# Patient Record
Sex: MALE | Race: WHITE | NOT HISPANIC OR LATINO | Employment: OTHER | URBAN - METROPOLITAN AREA
[De-identification: names, ages, dates, MRNs, and addresses within clinical notes are randomized per-mention and may not be internally consistent; named-entity substitution may affect disease eponyms.]

---

## 2017-02-21 RX ORDER — MYCOPHENOLATE MOFETIL 250 MG/1
CAPSULE ORAL
Qty: 120 CAPSULE | Refills: 11 | Status: SHIPPED | OUTPATIENT
Start: 2017-02-21 | End: 2018-03-19 | Stop reason: SDUPTHER

## 2017-04-03 ENCOUNTER — TELEPHONE (OUTPATIENT)
Dept: TRANSPLANT | Facility: CLINIC | Age: 57
End: 2017-04-03

## 2017-04-03 NOTE — TELEPHONE ENCOUNTER
Received call transferred from , Dr. Moore would like to speak to MD regarding patient.  He states patient has 4.5 grams of protein in his urine and may require a kidney biopsy.  Message given to Dr Can to contact MD as requested.

## 2017-04-06 ENCOUNTER — TELEPHONE (OUTPATIENT)
Dept: TRANSPLANT | Facility: CLINIC | Age: 57
End: 2017-04-06

## 2017-04-06 NOTE — TELEPHONE ENCOUNTER
Dr. Moore called noting he is now spilling protein. A recent 24 hr urine showed over 4 grams protein. 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   I requested Dr. Moore have his office fax lab results for our records.  Given h/o APA syndrome requiring chronic anticoagulation, he will need to be admitted electively for heparin bridge OR get bridged with lovenox, depending on insurance coverage. Will ask coordinator to assess lovenox coverage while we await current labs, then depending on insurance information, make arrangements for biopsy shortly.  Will query pharmacist re possible lovenox regimen pre and post biopsy.

## 2017-04-07 RX ORDER — WARFARIN 2.5 MG/1
TABLET ORAL
Qty: 15 TABLET | Refills: 11
Start: 2017-04-07 | End: 2017-04-28 | Stop reason: SDUPTHER

## 2017-04-10 ENCOUNTER — TELEPHONE (OUTPATIENT)
Dept: TRANSPLANT | Facility: CLINIC | Age: 57
End: 2017-04-10

## 2017-04-10 RX ORDER — ENOXAPARIN SODIUM 100 MG/ML
1 INJECTION SUBCUTANEOUS EVERY 12 HOURS
Qty: 20 ML | Refills: 0 | Status: SHIPPED | OUTPATIENT
Start: 2017-04-10 | End: 2017-04-28 | Stop reason: DRUGHIGH

## 2017-04-10 NOTE — TELEPHONE ENCOUNTER
Patient's wife contacted this coordinator.  Patient is at work per wife.  Advised of STAT labs needed.  Patient will have labs tomorrow AM.  Discussed with wife plan for Lovenox, but pending labs.  She states patient stopped taking Coumadin on 4/8.  Plan for biopsy no later than Thursday.

## 2017-04-11 ENCOUNTER — TELEPHONE (OUTPATIENT)
Dept: TRANSPLANT | Facility: CLINIC | Age: 57
End: 2017-04-11

## 2017-04-11 ENCOUNTER — DOCUMENTATION ONLY (OUTPATIENT)
Dept: TRANSPLANT | Facility: CLINIC | Age: 57
End: 2017-04-11

## 2017-04-11 LAB
EXT BUN: 19
EXT CALCIUM: 9.2
EXT CHLORIDE: 101
EXT CO2: 29
EXT CREATININE: 1 MG/DL
EXT GLUCOSE: 140
EXT INR: 1
EXT POTASSIUM: 4.6
EXT SODIUM: 137 MMOL/L

## 2017-04-11 NOTE — TELEPHONE ENCOUNTER
Reviewed outside labs received with MD.  Patient's wife contacted and given instructions per Md to begin Lovenox tonight.  Patient will start 8 am and 8 pm dosing of Lovenox, [facility] contacted, rx is ready.      Reviewed with wife details of biopsy on Monday.     Wife repeated and verbalized understanding of instructions.  Will discuss with social workers regarding patient's request for [facility] stay on Sunday Evening for biopsy on Monday.

## 2017-04-12 DIAGNOSIS — R80.9 PROTEINURIA, UNSPECIFIED TYPE: ICD-10-CM

## 2017-04-12 DIAGNOSIS — Z94.0 KIDNEY REPLACED BY TRANSPLANT: Primary | ICD-10-CM

## 2017-04-17 ENCOUNTER — HOSPITAL ENCOUNTER (OUTPATIENT)
Facility: HOSPITAL | Age: 57
Discharge: HOME | End: 2017-04-17
Attending: INTERNAL MEDICINE | Admitting: RADIOLOGY
Payer: MEDICARE

## 2017-04-17 ENCOUNTER — DOCUMENTATION ONLY (OUTPATIENT)
Dept: TRANSPLANT | Facility: CLINIC | Age: 57
End: 2017-04-17

## 2017-04-17 ENCOUNTER — HOSPITAL ENCOUNTER (OUTPATIENT)
Dept: RADIOLOGY | Facility: HOSPITAL | Age: 57
Discharge: HOME | End: 2017-04-17
Attending: INTERNAL MEDICINE | Admitting: INTERNAL MEDICINE
Payer: MEDICARE

## 2017-04-17 ENCOUNTER — TELEPHONE (OUTPATIENT)
Dept: TRANSPLANT | Facility: CLINIC | Age: 57
End: 2017-04-17

## 2017-04-17 VITALS
RESPIRATION RATE: 18 BRPM | TEMPERATURE: 98 F | HEART RATE: 66 BPM | SYSTOLIC BLOOD PRESSURE: 148 MMHG | DIASTOLIC BLOOD PRESSURE: 76 MMHG | OXYGEN SATURATION: 98 %

## 2017-04-17 DIAGNOSIS — R80.9 PROTEINURIA, UNSPECIFIED TYPE: ICD-10-CM

## 2017-04-17 DIAGNOSIS — Z94.0 KIDNEY REPLACED BY TRANSPLANT: ICD-10-CM

## 2017-04-17 DIAGNOSIS — T86.10 COMPLICATION OF KIDNEY TRANSPLANT: ICD-10-CM

## 2017-04-17 LAB
EXT ALBUMIN: 3.5
EXT ALKALINE PHOSPHATASE: 117
EXT ALT: 19
EXT AST: 23
EXT BILIRUBIN TOTAL: 0.5
EXT BUN: 17
EXT CALCIUM: 9.2
EXT CHLORIDE: 103
EXT CO2: 27
EXT CREATININE: 1 MG/DL
EXT EOSINOPHIL%: 2
EXT GFR MDRD NON AF AMER: >60
EXT GLUCOSE: 140
EXT HEMATOCRIT: 45
EXT HEMOGLOBIN A1C: 5.8
EXT HEMOGLOBIN: 15
EXT LYMPH%: 26
EXT MONOCYTES%: 10
EXT PHOSPHORUS: 2.8
EXT PLATELETS: 97
EXT POTASSIUM: 5.2
EXT PROT/CREAT RATIO UR: 1.59
EXT PROTEIN TOTAL: 5.6
EXT PTH, INTACT: 140
EXT SEGS%: 62
EXT SODIUM: 138 MMOL/L
EXT TACROLIMUS LVL: 4
EXT WBC: 4.2

## 2017-04-17 PROCEDURE — 76776 US EXAM K TRANSPL W/DOPPLER: CPT | Mod: TC

## 2017-04-17 PROCEDURE — 76776 US EXAM K TRANSPL W/DOPPLER: CPT | Mod: 26,GC,, | Performed by: RADIOLOGY

## 2017-04-17 PROCEDURE — 25000003 PHARM REV CODE 250: Performed by: STUDENT IN AN ORGANIZED HEALTH CARE EDUCATION/TRAINING PROGRAM

## 2017-04-17 PROCEDURE — 63600175 PHARM REV CODE 636 W HCPCS: Performed by: STUDENT IN AN ORGANIZED HEALTH CARE EDUCATION/TRAINING PROGRAM

## 2017-04-17 RX ADMIN — DESMOPRESSIN ACETATE 14.84 MCG: 4 SOLUTION INTRAVENOUS at 12:04

## 2017-04-17 NOTE — IP AVS SNAPSHOT
[facility]  [address]  [facility] [address]  Phone: [phone]           Patient Discharge Instructions   Our goal is to set you up for success. This packet includes information on your condition, medications, and your home care.  It will help you care for yourself to prevent having to return to the hospital.     Please ask your nurse if you have any questions.      There are many details to remember when preparing to leave the hospital. Here is what you will need to do:    1. Take your medicine. If you are prescribed medications, review your Medication List on the following pages. You may have new medications to  at the pharmacy and others that you'll need to stop taking. Review the instructions for how and when to take your medications. Talk with your doctor or nurses if you are unsure of what to do.     2. Go to your follow-up appointments. Specific follow-up information is listed in the following pages. Your may be contacted by a nurse or clinical provider about future appointments. Be sure we have all of the phone numbers to reach you. Please contact your provider's office if you are unable to make an appointment.     3. Watch for warning signs. Your doctor or nurse will give you detailed warning signs to watch for and when to call for assistance. These instructions may also include educational information about your condition. If you experience any of warning signs to your health, call your doctor.           [facility]  Unless otherwise directed by your provider, please   contact [facility], our nurse care line   that is available for 24/7 assistance.     [phone] (toll-free)     Registered nurses in the [facility]   provide: appointment scheduling, clinical advisement, health education, and other advisory services.                  ** Verify the list of medication(s) below is accurate and up to date. Carry this with you in case of  emergency. If your medications have changed, please notify your healthcare provider.             Medication List      ASK your doctor about these medications        Additional Info                      aspirin 81 mg Tab   Refills:  0   Dose:  1 tablet    Instructions:  Take 1 tablet by mouth once daily.     Begin Date    AM    Noon    PM    Bedtime       atorvastatin 40 MG tablet   Commonly known as:  LIPITOR   Quantity:  30 tablet   Refills:  5   Dose:  40 mg    Instructions:  Take 1 tablet (40 mg total) by mouth once daily. 1 Tablet Oral Every day     Begin Date    AM    Noon    PM    Bedtime       EFFIENT 10 mg Tab   Refills:  0   Dose:  10 mg   Generic drug:  prasugrel    Instructions:  Take 10 mg by mouth once daily.     Begin Date    AM    Noon    PM    Bedtime       enoxaparin 100 mg/mL Syrg   Commonly known as:  LOVENOX   Quantity:  20 mL   Refills:  0   Dose:  1 mg/kg    Instructions:  Inject 1 mL (100 mg total) into the skin every 12 (twelve) hours.     Begin Date    AM    Noon    PM    Bedtime       ERGOCALCIFEROL (VITAMIN D2) MISC   Refills:  0   Dose:  1.25 mg    Instructions:  1.25 mg by Misc.(Non-Drug; Combo Route) route every 30 days.     Begin Date    AM    Noon    PM    Bedtime       esomeprazole 20 MG capsule   Commonly known as:  NEXIUM   Refills:  0   Dose:  40 mg    Instructions:  Take 40 mg by mouth before breakfast.     Begin Date    AM    Noon    PM    Bedtime       levothyroxine 50 MCG tablet   Commonly known as:  SYNTHROID   Refills:  0   Dose:  50 mcg    Instructions:  Take 50 mcg by mouth once daily.     Begin Date    AM    Noon    PM    Bedtime       linagliptin 5 mg Tab tablet   Commonly known as:  TRADJENTA   Refills:  0   Dose:  5 mg    Instructions:  Take 5 mg by mouth once daily.     Begin Date    AM    Noon    PM    Bedtime       metoprolol tartrate 100 MG tablet   Commonly known as:  LOPRESSOR   Quantity:  90 tablet   Refills:  11   Dose:  100 mg    Instructions:  Take 1 tablet  (100 mg total) by mouth 2 (two) times daily. HOLD DOSE if HR<50 and call     Begin Date    AM    Noon    PM    Bedtime       multivitamin tablet   Commonly known as:  THERAGRAN   Quantity:  30 tablet   Refills:  0   Dose:  1 tablet    Instructions:  Take 1 tablet by mouth once daily.     Begin Date    AM    Noon    PM    Bedtime       mycophenolate 250 mg Cap   Commonly known as:  CELLCEPT   Quantity:  120 capsule   Refills:  11    Instructions:  TAKE (2) CAPSULES TWICE DAILY.     Begin Date    AM    Noon    PM    Bedtime       nifedipine 60 MG (OSM) 24 hr tablet   Commonly known as:  PROCARDIA-XL   Quantity:  60 tablet   Refills:  11   Dose:  60 mg    Instructions:  Take 1 tablet (60 mg total) by mouth 2 (two) times daily.     Begin Date    AM    Noon    PM    Bedtime       tacrolimus 1 MG Cap   Commonly known as:  PROGRAF   Quantity:  60 capsule   Refills:  11   Dose:  1 mg    Instructions:  Take 1 capsule (1 mg total) by mouth every 12 (twelve) hours.     Begin Date    AM    Noon    PM    Bedtime       warfarin 2.5 MG tablet   Commonly known as:  COUMADIN   Quantity:  15 tablet   Refills:  11    Instructions:  HOLD  For Biopsy     Begin Date    AM    Noon    PM    Bedtime                  Please bring to all follow up appointments:    1. A copy of your discharge instructions.  2. All medicines you are currently taking in their original bottles.  3. Identification and insurance card.    Please arrive 15 minutes ahead of scheduled appointment time.    Please call 24 hours in advance if you must reschedule your appointment and/or time.            Discharge Instructions         Discharge Instructions for Kidney Biopsy  You had a procedure called a kidney biopsy. Your doctor used a special needle to remove a small piece of tissue from your kidney to examine it for signs of damage and disease. A kidney biopsy is ordered after other tests have shown that there may be a problem with your kidney. Kidney biopsies are also  performed when kidney disease is suspected and to rule out cancer.  Home care  · Rest for 24 hours to 48 hours. Get up only to use the bathroom.  · Dont drive for 24 hours to 48 hours after the procedure.  · Dont shower for 24 hours after the biopsy. If you wish, you may wash yourself with a sponge or washcloth. When you are able to shower, dont scrub the site. Gently wash the area and pat it dry.  · Remove the bandage covering the biopsy site 24 hours to 48 hours after the procedure.  · Dont lift anything heavier than 10 pounds for 3 days to 4 days after the procedure.  · Ask your doctor when you can return to work. Be sure to tell your doctor if your job involves heavy lifting.  · If you normally take blood thinner medications (anticoagulants or antiplatelet medications) and you stopped taking them a few days before your procedure, ask your doctor when to start taking them again.  When to seek medical care  Call your doctor right away if you have any of the following:  · Blood in your urine  · Exhaustion or extreme weakness  · Dizziness or lightheadedness  · Sudden or increased shortness of breath  · Sudden chest pain  · Fever of 100.4°F (38°C) or higher, or chills  · Increasing redness, tenderness, or swelling at the biopsy site  · Opening of or drainage or bleeding from the biopsy site  · Increasing pain, with or without activity   Date Last Reviewed: 1/6/2015  © 7433-4555 [employer]. [address] [place] [address]. All rights reserved. This information is not intended as a substitute for professional medical care. Always follow your healthcare professional's instructions.            Admission Information     Date & Time Provider Department CSN    4/17/2017 11:12 AM Rose Woods MD [facility] [id number]      Care Providers     Provider Role Specialty Primary office phone    Rose Woods MD Attending Provider Nephrology [phone]     Rose Woods MD Surgeon  Nephrology [phone]      Your Vitals Were     BP Pulse Temp Resp SpO2       146/79 66 97.1 °F (36.2 °C) (Oral) 18 98%       Recent Lab Values        8/14/2013 10/9/2013 3/27/2017                     4:40 PM  3:20 AM          A1C 6.2 7.6 (H) 5.8                   Allergies as of 4/17/2017        Reactions    No Known Drug Allergies       Advance Directives     An advance directive is a document which, in the event you are no longer able to make decisions for yourself, tells your healthcare team what kind of treatment you do or do not want to receive, or who you would like to make those decisions for you.  If you do not currently have an advance directive, [facility] encourages you to create one.  For more information call:  [phone]), [phone] ([phone]),  or log on to [email or web address].        Smoking Cessation     If you would like to quit smoking:   You may be eligible for free services if you are a [place] resident and started smoking cigarettes before September 1, 1988.  Call the Smoking Cessation Trust ([facility]) toll free at [phone] or [phone].   Call 6-913-QUIT-NOW if you do not meet the above criteria.   Contact us via email: [email or web address]   View our website for more information: [email or web address]        Language Assistance Services     ATTENTION: Language assistance services are available, free of charge. Please call [phone].      ATENCIÓN: Si habla español, tiene a allen disposición servicios gratuitos de asistencia lingüística. Llame al [phone].     CHÚ Ý: N?u b?n nói Ti?ng Vi?t, có các d?ch v? h? tr? ngôn ng? mi?n phí dành cho b?n. G?i s? [phone].        Coumadin Discharge Instructions                         Chronic Kindey Disease Education              [facility] complies with applicable Federal civil rights laws and does not discriminate on the basis of race,  color, national origin, age, disability, or sex.

## 2017-04-17 NOTE — DISCHARGE INSTRUCTIONS
Discharge Instructions for Kidney Biopsy  You had a procedure called a kidney biopsy. Your doctor used a special needle to remove a small piece of tissue from your kidney to examine it for signs of damage and disease. A kidney biopsy is ordered after other tests have shown that there may be a problem with your kidney. Kidney biopsies are also performed when kidney disease is suspected and to rule out cancer.  Home care  · Rest for 24 hours to 48 hours. Get up only to use the bathroom.  · Dont drive for 24 hours to 48 hours after the procedure.  · Dont shower for 24 hours after the biopsy. If you wish, you may wash yourself with a sponge or washcloth. When you are able to shower, dont scrub the site. Gently wash the area and pat it dry.  · Remove the bandage covering the biopsy site 24 hours to 48 hours after the procedure.  · Dont lift anything heavier than 10 pounds for 3 days to 4 days after the procedure.  · Ask your doctor when you can return to work. Be sure to tell your doctor if your job involves heavy lifting.  · If you normally take blood thinner medications (anticoagulants or antiplatelet medications) and you stopped taking them a few days before your procedure, ask your doctor when to start taking them again.  When to seek medical care  Call your doctor right away if you have any of the following:  · Blood in your urine  · Exhaustion or extreme weakness  · Dizziness or lightheadedness  · Sudden or increased shortness of breath  · Sudden chest pain  · Fever of 100.4°F (38°C) or higher, or chills  · Increasing redness, tenderness, or swelling at the biopsy site  · Opening of or drainage or bleeding from the biopsy site  · Increasing pain, with or without activity   Date Last Reviewed: 1/6/2015  © 0990-3099 [employer]. [address] [place] [address], [place] [address]. All rights reserved. This information is not intended as a substitute for professional medical care. Always follow your  healthcare professional's instructions.

## 2017-04-17 NOTE — PLAN OF CARE
Problem: Patient Care Overview  Goal: Plan of Care Review    Vitals stable, no complaints from patient.Consents in chart. Patient verbalized understanding discharge instructions.

## 2017-04-17 NOTE — PROCEDURES
Radiology Post-Procedure Note    Pre Op Diagnosis: Renal dysfunction    Post Op Diagnosis: Same    Procedure: Ultrasound guided kidney transplant biopsy    Procedure performed by: Saumya Martin MD      Written Informed Consent Obtained: Yes    Specimen Removed: YES 3 cores    Estimated Blood Loss: Minimal    Findings: Moderate sedation and local anesthesia were used.    A 16-gauge Monopty biopsy device was used to remove 3 specimens from the transplant  kidney under ultrasound guidance.  Tissue was evaluated  for adequacy and sent to pathology for further analysis.      The patient tolerated the procedure well and there were no complications.  Please see Imaging report for further details.    Dustin Lavricha  Radiology PGY-3

## 2017-04-17 NOTE — DISCHARGE SUMMARY
Radiology Discharge Summary      Hospital Course: No complications    Admit Date: 4/17/2017  Discharge Date: 04/17/2017     Instructions Given to Patient: Yes  Diet: regular diet and Resume prior diet  Activity: activity as tolerated    Description of Condition on Discharge: Stable  Vital Signs (Most Recent):      Discharge Disposition: Home    Discharge Diagnosis: s/p kidney transplant biopsy    Follow-up per ordering provider.    Dustin Arthur  Radiology PGY-3

## 2017-04-17 NOTE — TELEPHONE ENCOUNTER
Pt contacted and advised of MD's lab review and instructions.  Pt. repeated instructions and verbalized understanding.     Reviewed anticoagulation bridge plans in detail: Patient to restart Lovenox and Coumadin on Tuesday night per MD orders.  Patient will take Lovenox @ 8 pm and 8 am.  Labs on Thursday @ 12 noon. Advised to get to the lab early so that labs are drawn right at 12 noon.    Discussed signs of bleeding with patient and wife, advised to contact transplant team ASAP, but report to ER for any bright red bleeding.  Wife repeated instructions and took notes, she verbalized understanding.    Advised of need to avoid high K foods in his diet, list given, patient/wife verbalized understanding.

## 2017-04-17 NOTE — PLAN OF CARE
Problem: Fall Risk (Adult)  Goal: Identify Related Risk Factors and Signs and Symptoms  Related risk factors and signs and symptoms are identified upon initiation of Human Response Clinical Practice Guideline (CPG)   Bed in low position, call light within reach, patient visualized from nurses station.

## 2017-04-17 NOTE — H&P
Radiology History & Physical      SUBJECTIVE:     Chief Complaint: kidney transplant    History of Present Illness:  Anthony Burgess is a 57 y.o. male with proteinuria who presents for  US guided kidney transplant biopsy.  Past Medical History:   Diagnosis Date    Anemia of chronic renal failure     Anticoagulant long-term use     Antiphospholipid antibody syndrome     pulmonary embolism and arterial occlusions    Asthma     CKD (chronic kidney disease) 2013    CKD (chronic kidney disease), stage II 10/9/2013    Coronary artery disease     -donor kidney transplant for GN 13    Elevated glucose 2013    ESRD (end stage renal disease) 2005    secondary to glomerulonephritis    Hyperglycemia 2013    Hyperlipidemia     Hypertension 2014    Hypertension, renal     Hypophosphatemia     Hypothyroidism     Kidney stone     Myocardial infarction     Neutropenia, drug-induced 2013    Prophylactic immunotherapy for kidney transplant     PVD (peripheral vascular disease)     s/p right fem-pop    Secondary hyperparathyroidism of renal origin      Past Surgical History:   Procedure Laterality Date    CARDIAC SURGERY      FEMORAL BYPASS      Right lower extremity    FRACTURE SURGERY      KIDNEY TRANSPLANT      LEG SURGERY      TONSILLECTOMY      VASCULAR SURGERY         Home Meds:   Prior to Admission medications    Medication Sig Start Date End Date Taking? Authorizing Provider   atorvastatin (LIPITOR) 40 MG tablet Take 1 tablet (40 mg total) by mouth once daily. 1 Tablet Oral Every day 13  Yes Tierra Tnoey, NP   enoxaparin (LOVENOX) 100 mg/mL Syrg Inject 1 mL (100 mg total) into the skin every 12 (twelve) hours. 4/10/17  Yes Rose Woods MD   ERGOCALCIFEROL, VITAMIN D2, MISC 1.25 mg by Misc.(Non-Drug; Combo Route) route every 30 days.   Yes Historical Provider, MD   esomeprazole (NEXIUM) 20 MG capsule Take 40 mg by  mouth before breakfast.    Yes Historical Provider, MD   levothyroxine (SYNTHROID) 50 MCG tablet Take 50 mcg by mouth once daily.   Yes Historical Provider, MD   linagliptin (TRADJENTA) 5 mg Tab tablet Take 5 mg by mouth once daily. 10/19/14 7/13/17 Yes Ines Mckeon MD   mycophenolate (CELLCEPT) 250 mg Cap TAKE (2) CAPSULES TWICE DAILY. 2/21/17  Yes Calvin Gerber MD   nifedipine (PROCARDIA-XL) 60 MG (OSM) 24 hr tablet Take 1 tablet (60 mg total) by mouth 2 (two) times daily.  Patient taking differently: Take 60 mg by mouth once daily.  8/23/13  Yes Rose Woods MD   tacrolimus (PROGRAF) 1 MG Cap Take 1 capsule (1 mg total) by mouth every 12 (twelve) hours. 7/7/16  Yes Rose Woods MD   aspirin 81 mg Tab Take 1 tablet by mouth once daily.     Historical Provider, MD   metoprolol tartrate (LOPRESSOR) 100 MG tablet Take 1 tablet (100 mg total) by mouth 2 (two) times daily. HOLD DOSE if HR<50 and call  Patient taking differently: Take 50 mg by mouth 2 (two) times daily. HOLD DOSE if HR<50 and call 1/5/15   Aly Hoffman MD   multivitamin (THERAGRAN) tablet Take 1 tablet by mouth once daily. 7/16/13   Tierra Toney, NP   prasugrel (EFFIENT) 10 mg Tab Take 10 mg by mouth once daily.    Historical Provider, MD   warfarin (COUMADIN) 2.5 MG tablet HOLD  For Biopsy 4/7/17   Rose Woods MD     Anticoagulants/Antiplatelets: warfarin held for procedure with lovenox bridge, last dose of lovenox yesterday morining, aspirn held     Allergies:   Review of patient's allergies indicates:   Allergen Reactions    No known drug allergies      Sedation History:  no adverse reactions    Review of Systems:   Hematological: no known coagulopathies  Respiratory: no shortness of breath  Cardiovascular: no chest pain  Gastrointestinal: no abdominal pain  Genito-Urinary: no dysuria  Musculoskeletal: negative  Neurological: no TIA or stroke symptoms         OBJECTIVE:     Vital Signs (Most  Recent)       Physical Exam:  ASA: 3  Mallampati: n/a    General: no acute distress  Mental Status: alert and oriented to person, place and time  HEENT: normocephalic, atraumatic  Chest: unlabored breathing  Heart: regular heart rate  Abdomen: nondistended  Extremity: moves all extremities    Laboratory  Lab Results   Component Value Date    INR 0.9 04/17/2017       Lab Results   Component Value Date    WBC 3.10 (L) 04/17/2017    HGB 14.9 04/17/2017    HCT 44.7 04/17/2017    MCV 90 04/17/2017    PLT 97 (L) 04/17/2017      Lab Results   Component Value Date     (H) 04/17/2017     04/17/2017    K 5.3 (H) 04/17/2017     04/17/2017    CO2 29 04/17/2017    BUN 18 04/17/2017    CREATININE 1.0 04/17/2017    CALCIUM 9.0 04/17/2017    MG 1.6 07/21/2013    ALT 69 (H) 07/21/2013    AST 43 (H) 07/21/2013    ALBUMIN 3.6 08/05/2013    BILITOT 0.4 07/21/2013    BILIDIR 0.2 07/21/2013       ASSESSMENT/PLAN:     Sedation Plan: local only  Patient will undergo US guided kidney transplant biopsy.    Dustin Arthur  Radiology PGY-3

## 2017-04-17 NOTE — TELEPHONE ENCOUNTER
----- Message from Pat Mckeon MD sent at 4/17/2017  3:53 PM CDT -----  Potassium on higher end -->  on low potassium diet

## 2017-04-18 ENCOUNTER — TELEPHONE (OUTPATIENT)
Dept: TRANSPLANT | Facility: CLINIC | Age: 57
End: 2017-04-18

## 2017-04-19 ENCOUNTER — TELEPHONE (OUTPATIENT)
Dept: TRANSPLANT | Facility: CLINIC | Age: 57
End: 2017-04-19

## 2017-04-19 NOTE — TELEPHONE ENCOUNTER
Biopsy 4/17/17 (proteinuria): 21 glomeruli, 2 globally sclerosed, 1 with segmental sclerosis, 1 with fibrocellular crescent (from ischemia). No ACR, AVR, microcirculation changes, c4d negative. IF consistent with membranous pattern. PLA2R, kappa and lambda stains pending.  Plan: obtain PLA2R from blood and schedule appointment in 2 weeks to review biopsy findings (by then should have final report). Also, Let's call his nephrologist's office to get a copy of native biopsy report (EMR has conflicting information).

## 2017-04-19 NOTE — TELEPHONE ENCOUNTER
----- Message from Mery Cast sent at 4/19/2017 12:36 PM CDT -----  Contact: patient   Patient would like a call from [place]. Please call

## 2017-04-19 NOTE — TELEPHONE ENCOUNTER
Call placed to patient, discussed biopsy results and MD instructions.  Patient and wife verbalized understanding.  Patient also wanted to discuss his concern with taking Lovenox along with Coumadin 5 mg.  He says he would be more comfortable restarting on 1 mg of Coumadin.  Patient has STAT labs scheduled in the AM.    Message forwarded to MD.

## 2017-04-20 ENCOUNTER — DOCUMENTATION ONLY (OUTPATIENT)
Dept: TRANSPLANT | Facility: CLINIC | Age: 57
End: 2017-04-20

## 2017-04-20 ENCOUNTER — TELEPHONE (OUTPATIENT)
Dept: TRANSPLANT | Facility: CLINIC | Age: 57
End: 2017-04-20

## 2017-04-20 LAB
EXT BUN: 16
EXT CALCIUM: 9
EXT CHLORIDE: 100
EXT CO2: 25
EXT CREATININE: 1 MG/DL
EXT GLUCOSE: 189
EXT INR: 1
EXT POTASSIUM: 4.6
EXT SODIUM: 136 MMOL/L

## 2017-04-20 NOTE — TELEPHONE ENCOUNTER
Call placed to pharm D, discussed INR results and patient's concerned noted on yesterday.  Pharm FRANSICO, recommends 5 mg coumadin until therapeutic level with repeat INR tomorrow and then 3 times weekly.      Patient contacted, he reports he actually took 2.5 mg of Coumadin and he verbalized understanding of instructions.  Patient encouraged to get back in to see the Coumadin clinic at his cardiologist office, he verbalized understanding.  All questions answered.

## 2017-04-21 ENCOUNTER — TELEPHONE (OUTPATIENT)
Dept: TRANSPLANT | Facility: CLINIC | Age: 57
End: 2017-04-21

## 2017-04-21 LAB — EXT INR: 1

## 2017-04-21 NOTE — TELEPHONE ENCOUNTER
Call placed to Pharm D regarding INR of 1.0, advised that anti Xa will not be available until next week...  instructions received to increase Coumadin to 5 mg daily, patient verbalized understanding.   Advised to repeat labs, but patient requesting to repeat labs on Tuesday.

## 2017-04-21 NOTE — TELEPHONE ENCOUNTER
Call placed to the lab @ [facility] to request PT/INR results and anti Xa, per staff, this lab wont be resulted until 5-7 days, stating it is a send out to [facility].

## 2017-04-21 NOTE — TELEPHONE ENCOUNTER
PLA2R non-diagnostic. EM was performed on parafin tissues (thus suboptimal sample): + subepithelial deposits but no subendothelial deposits noted.  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 Pathologist suggests r/o ANCA associated disease.    PLAN- check ANCA with next labs. Await patient's visit to discuss biopsy and possible rituxan use.

## 2017-04-26 ENCOUNTER — DOCUMENTATION ONLY (OUTPATIENT)
Dept: TRANSPLANT | Facility: CLINIC | Age: 57
End: 2017-04-26

## 2017-04-26 ENCOUNTER — TELEPHONE (OUTPATIENT)
Dept: TRANSPLANT | Facility: CLINIC | Age: 57
End: 2017-04-26

## 2017-04-26 LAB — EXT INR: 1.5

## 2017-04-26 NOTE — PROGRESS NOTES
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 Pt has 5 mg tablets.    Mr Burgess is being bridged with enoxaparin following a kidney biopsy.  Transplant nephrology is following his INR/anti-Xa monitoring until he is back in the therapeutic range and care can return to his cardiologist.  After speaking to Mr Burgess, it was revealed that he initially cut his 5 mg tablets into quarters and was increasing his dose by 1/2 tablet (1.25 mg) from 1/4 tablet (1.25 mg) daily, which would explain why it is taking so long to reach a therapeutic INR.  He reports he has been taking 7.5 mg for approximately the last four days.      Spoke with Anthony Burgess regarding their warfarin therapy. Patient reports no signs of bleeding, no changes in medication or vitamin k in his diet. Patient is being monitored on interactions with these medications: none  Anthony Burgess reports taking warfarin 7.5 mg last night (see chart below for recent history of doses received). Anthony Burgess INR is subtherapeutic at 1.5.     Additionally, monitoring for enoxaparin resulted today (drawn 4/21) was 1.5 IU/ml-- which is supratherapeutic (goal is 0.5-1.0)      Date 4.20 4.21 4.22 4.23 4.24 4/25   Warfarin Dose (mg)   ? ? 7.5 7.5 7.5 7.5    INR  1    1.5     Plan for warfarin monitoring: Recommended to continue 7.5 mg and recheck INR again Friday.  Patient reports he has a home INR monitor that he has used for years.  Additionally, I advised him to decrease his enoxaparin dose to 80 mg (20% decrease).  I explained to the patient that his enoxaparin syringe is calibrated-- he reports he has done this before.  Patient to transition to his home cardiologist when stable for further warfarin monitoring.  Patient verbalized understanding.

## 2017-04-26 NOTE — TELEPHONE ENCOUNTER
Call placed to patient, discussed results received.  Patient denies any s/s of bleeding, encouraged patient to monitoring for s/s and report ASAP.  Patient reporting his is taking Coumadin 5 mg nightly and Lovenox as ordered every 12 hours.  Verified with patient that he has a new supply of Lovenox.  This coordinator will review plan with nephrologist and pharm D and advise patient.  Patient verbalized understanding.

## 2017-04-28 ENCOUNTER — TELEPHONE (OUTPATIENT)
Dept: TRANSPLANT | Facility: CLINIC | Age: 57
End: 2017-04-28

## 2017-04-28 DIAGNOSIS — D68.61 ANTIPHOSPHOLIPID SYNDROME: Primary | ICD-10-CM

## 2017-04-28 RX ORDER — WARFARIN SODIUM 5 MG/1
7.5 TABLET ORAL DAILY
COMMUNITY
Start: 2017-04-28 | End: 2017-05-02 | Stop reason: DRUGHIGH

## 2017-04-28 RX ORDER — ENOXAPARIN SODIUM 100 MG/ML
80 INJECTION SUBCUTANEOUS EVERY 12 HOURS
Qty: 10 SYRINGE | Refills: 0 | Status: SHIPPED | OUTPATIENT
Start: 2017-04-28 | End: 2017-05-08

## 2017-04-28 NOTE — TELEPHONE ENCOUNTER
----- Message from Shawn Rodriguez sent at 4/28/2017  8:17 AM CDT -----  Contact: pt   Calling to see if he still needs to have labs because of INR, please call

## 2017-04-28 NOTE — TELEPHONE ENCOUNTER
Attempted contact, no answer.  Left detailed message, patient to have INR done and report to pharm D for instructions for Coumadin dosing this afternoon.

## 2017-04-28 NOTE — PROGRESS NOTES
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 Pt has 5 mg tablets.    Mr Burgess is being bridged with enoxaparin following a kidney biopsy.  Transplant nephrology is following his INR/anti-Xa monitoring until he is back in the therapeutic range and care can return to his cardiologist.  After speaking to Mr Burgess, it was revealed that he initially cut his 5 mg tablets into quarters and was increasing his dose by 1/2 tablet (1.25 mg) from 1/4 tablet (1.25 mg) daily, which would explain why it is taking so long to reach a therapeutic INR.  However, has been taking 7.5 mg nightly for approximately the last six days.  He is also self-injecting enoxaparin 80 mg SQ BID, which was adjusted for a supratherapeutic anti-Xa on 4/25.    Spoke with Anthony Burgess regarding their warfarin therapy. Patient reports no signs of bleeding, no changes in medication or vitamin k in his diet. Patient is being monitored on interactions with these medications: none  Anthony Burgess reports taking warfarin 7.5 mg last night (see chart below for recent history of doses received). Anthony Burgess INR is subtherapeutic at 1.8, which was checked using his home monitor.           Date 4.20 4.21 4.22 4.23 4.24 4/25 4/26 4/27 4/28   Warfarin Dose (mg)   ? ? 7.5 7.5 7.5 7.5  7.5 7.5 7.5   INR  1    1.5   1.8     Plan for warfarin monitoring: Recommended to continue 7.5 mg and recheck INR again Monday using his home INR monitor that he has used for years.   I expect that his INR will be therapeutic at that point.  On Friday afternoon, his wife reported that he only had 4 syringes remaining.  Sent a new prescription for 80 mg syringes to the [facility] in [place].  Discussed with the wife that this is a different size syringe than before (was using 0.8ml of a 1 ml syringe), will be able to administer the entire syringe  (80 mg). Patient to transition to his home cardiologist when stable for further warfarin monitoring.  Patient verbalized understanding.

## 2017-05-02 ENCOUNTER — TELEPHONE (OUTPATIENT)
Dept: TRANSPLANT | Facility: CLINIC | Age: 57
End: 2017-05-02

## 2017-05-02 ENCOUNTER — PATIENT MESSAGE (OUTPATIENT)
Dept: TRANSPLANT | Facility: CLINIC | Age: 57
End: 2017-05-02

## 2017-05-02 ENCOUNTER — DOCUMENTATION ONLY (OUTPATIENT)
Dept: TRANSPLANT | Facility: CLINIC | Age: 57
End: 2017-05-02

## 2017-05-02 DIAGNOSIS — Z79.60 LONG-TERM USE OF IMMUNOSUPPRESSANT MEDICATION: ICD-10-CM

## 2017-05-02 DIAGNOSIS — Z94.0 KIDNEY REPLACED BY TRANSPLANT: ICD-10-CM

## 2017-05-02 DIAGNOSIS — D68.1 CONGENITAL FACTOR XI DEFICIENCY: Primary | ICD-10-CM

## 2017-05-02 LAB — EXT INR: 1.8

## 2017-05-02 RX ORDER — WARFARIN SODIUM 5 MG/1
TABLET ORAL
Qty: 24 TABLET | Refills: 1 | Status: SHIPPED | OUTPATIENT
Start: 2017-05-02 | End: 2023-04-26 | Stop reason: ALTCHOICE

## 2017-05-02 NOTE — TELEPHONE ENCOUNTER
Kathy Green, Pharm D, Take 10 mg of Coumadin on Tuesday and Friday evenings and 7.5 mg on every other night. Repeat INR on Thursday.

## 2017-05-02 NOTE — TELEPHONE ENCOUNTER
Patient advised of plan for Coumadin per Pharm D. Patient reminded of appt on Monday, will have labs drawn prior to appt.  Patient verbalized understanding of all instructions, denied any s/s of bleeding at this time.

## 2017-05-02 NOTE — TELEPHONE ENCOUNTER
Call placed to patient, discussed with wife patient's INR.  Result received INR 1.8 on 5/1/17.  Sending to transplant pharm D.

## 2017-05-02 NOTE — PROGRESS NOTES
Kathy Oseguera, Pharm D patient to take 10 mg of Coumadin on Tuesday and Friday evenings and 7.5 mg on every other night.  Patient to have PT/INR drawn at [facility] on Thursday.

## 2017-05-02 NOTE — TELEPHONE ENCOUNTER
Contact made to nephrologist's office, requested biopsy report. Spoke to Karie who will fax report.

## 2017-05-03 DIAGNOSIS — Z94.0 KIDNEY REPLACED BY TRANSPLANT: Primary | ICD-10-CM

## 2017-05-03 DIAGNOSIS — R80.9 PROTEINURIA, UNSPECIFIED TYPE: ICD-10-CM

## 2017-05-04 ENCOUNTER — TELEPHONE (OUTPATIENT)
Dept: TRANSPLANT | Facility: CLINIC | Age: 57
End: 2017-05-04

## 2017-05-05 ENCOUNTER — TELEPHONE (OUTPATIENT)
Dept: PHARMACY | Facility: HOSPITAL | Age: 57
End: 2017-05-05

## 2017-05-05 ENCOUNTER — DOCUMENTATION ONLY (OUTPATIENT)
Dept: TRANSPLANT | Facility: CLINIC | Age: 57
End: 2017-05-05

## 2017-05-05 DIAGNOSIS — Z79.01 ANTICOAGULANT LONG-TERM USE: Primary | ICD-10-CM

## 2017-05-05 LAB — EXT INR: 2.1

## 2017-05-05 NOTE — TELEPHONE ENCOUNTER
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 Pt has 5 mg tablets.     Mr Burgess is being bridged with enoxaparin following a kidney biopsy. Transplant nephrology is following his INR/anti-Xa monitoring until he is back in the therapeutic range and care can return to his cardiologist. After speaking to Mr Burgess, it was revealed that he initially cut his 5 mg tablets into quarters and was increasing his dose by 1/2 tablet (1.25 mg) from 1/4 tablet (1.25 mg) daily, which would explain why it is taking so long to reach a therapeutic INR. However, has been taking 7.5 mg nightly for approximately the last six days. He is also self-injecting enoxaparin 80 mg SQ BID, which was adjusted for a supratherapeutic anti-Xa on 4/25.    Left a message for Anthony Burgess regarding his warfarin therapy.  Anthony Burgess should have been taking warfarin 7.5 mg (see chart below for recent history of doses received). Anthony Burgess INR is therapeutic at 2.1, which was checked using his home monitor.         Date 4.20 4.21 4.22 4.23 4.24 4/25 4/26 4/27 4/28 5/1 5/2 5/3 5/4   Warfarin Dose (mg)  ? ? 7.5 7.5 7.5 7.5  7.5 7.5 7.5 7.5 10 7.5 7.5   INR   1       1.5     1.8 1.8   2.1       Plan for warfarin monitoring: Recommended to continue 7.5 mg daily except 10 mg on Fridays and STOP lovenox injections and recheck INR again Monday (5/8) at [facility] lab. Patient to transition to his home cardiologist when stable for further warfarin monitoring. Patient verbalized understanding.

## 2017-05-08 ENCOUNTER — TELEPHONE (OUTPATIENT)
Dept: TRANSPLANT | Facility: CLINIC | Age: 57
End: 2017-05-08

## 2017-05-08 ENCOUNTER — OFFICE VISIT (OUTPATIENT)
Dept: TRANSPLANT | Facility: CLINIC | Age: 57
End: 2017-05-08
Payer: MEDICARE

## 2017-05-08 ENCOUNTER — LAB VISIT (OUTPATIENT)
Dept: LAB | Facility: HOSPITAL | Age: 57
End: 2017-05-08
Attending: INTERNAL MEDICINE
Payer: MEDICARE

## 2017-05-08 VITALS
TEMPERATURE: 98 F | OXYGEN SATURATION: 99 % | DIASTOLIC BLOOD PRESSURE: 68 MMHG | HEIGHT: 72 IN | WEIGHT: 222.69 LBS | HEART RATE: 57 BPM | SYSTOLIC BLOOD PRESSURE: 140 MMHG | BODY MASS INDEX: 30.16 KG/M2 | RESPIRATION RATE: 16 BRPM

## 2017-05-08 DIAGNOSIS — Z79.60 LONG-TERM USE OF IMMUNOSUPPRESSANT MEDICATION: ICD-10-CM

## 2017-05-08 DIAGNOSIS — D68.1 CONGENITAL FACTOR XI DEFICIENCY: ICD-10-CM

## 2017-05-08 DIAGNOSIS — Z79.01 ANTICOAGULANT LONG-TERM USE: ICD-10-CM

## 2017-05-08 DIAGNOSIS — Z29.89 PROPHYLACTIC IMMUNOTHERAPY: Chronic | ICD-10-CM

## 2017-05-08 DIAGNOSIS — Z94.0 KIDNEY REPLACED BY TRANSPLANT: Primary | ICD-10-CM

## 2017-05-08 DIAGNOSIS — D68.61 ANTIPHOSPHOLIPID ANTIBODY SYNDROME: ICD-10-CM

## 2017-05-08 DIAGNOSIS — Z94.0 DECEASED-DONOR KIDNEY TRANSPLANT: Chronic | ICD-10-CM

## 2017-05-08 DIAGNOSIS — N25.81 SECONDARY HYPERPARATHYROIDISM OF RENAL ORIGIN: ICD-10-CM

## 2017-05-08 DIAGNOSIS — I10 ESSENTIAL HYPERTENSION: Chronic | ICD-10-CM

## 2017-05-08 DIAGNOSIS — Z94.0 KIDNEY REPLACED BY TRANSPLANT: ICD-10-CM

## 2017-05-08 DIAGNOSIS — N02.2 MEMBRANOUS NEPHROPATHY DETERMINED BY BIOPSY: Primary | ICD-10-CM

## 2017-05-08 LAB
INR PPP: 2.3
PROTHROMBIN TIME: 23.3 SEC

## 2017-05-08 PROCEDURE — 99213 OFFICE O/P EST LOW 20 MIN: CPT | Mod: PBBFAC | Performed by: INTERNAL MEDICINE

## 2017-05-08 PROCEDURE — 99214 OFFICE O/P EST MOD 30 MIN: CPT | Mod: S$PBB,,, | Performed by: INTERNAL MEDICINE

## 2017-05-08 PROCEDURE — 99999 PR PBB SHADOW E&M-EST. PATIENT-LVL III: CPT | Mod: PBBFAC,,, | Performed by: INTERNAL MEDICINE

## 2017-05-08 RX ORDER — LANOLIN ALCOHOL/MO/W.PET/CERES
CREAM (GRAM) TOPICAL
COMMUNITY

## 2017-05-08 RX ORDER — BENAZEPRIL HYDROCHLORIDE 10 MG/1
10 TABLET ORAL NIGHTLY
COMMUNITY
End: 2017-10-20 | Stop reason: SDUPTHER

## 2017-05-08 NOTE — LETTER
May 8, 2017        Shai Moore  [address] [place], [place] [address]  [place] [address]  Phone: [phone]  Fax: [phone]             [facility]  [address]  [facility] [address]  Phone: [phone]   Patient: Anthony Burgess   MR Number: [id number]   YOB: 1960   Date of Visit: 5/8/2017       Dear Dr. Shai Moore    Thank you for referring Anthony Burgess to me for evaluation. Attached you will find relevant portions of my assessment and plan of care.    If you have questions, please do not hesitate to call me. I look forward to following Anthony Burgess along with you.    Sincerely,    Sherrie Castillo MD    Enclosure    If you would like to receive this communication electronically, please contact [email or web address] or [phone] to request [employer] access.    [employer] is a tool which provides read-only access to select patient information with whom you have a relationship. Its easy to use and provides real time access to review your patients record including encounter summaries, notes, results, and demographic information.    If you feel you have received this communication in error or would no longer like to receive these types of communications, please e-mail [email or web address]

## 2017-05-08 NOTE — TELEPHONE ENCOUNTER
Call placed to Dr. Moore's office to check on status of native kidney biopsy report, spoke to Karie, she reports there was no a biopsy report in patient's record, nor was there any mention of a biopsy.

## 2017-05-09 NOTE — PROGRESS NOTES
Kidney Post-Transplant Assessment    Referring Physician: Faraz Yao  Current Nephrologist: Shai Moore    ORGAN: LEFT KIDNEY  Donor Type:  - brain death ,   PHS Increased Risk: no  Cold Ischemia: 875 mins  Induction Medications: campath - alemtuzumab (anti-cd52)  CMV Status:    Subjective:     CC:  Reassessment of renal allograft function and management of chronic immunosuppression.    Kidney History:  Mr. Burgess is a 57 y.o. year old  or Other  male who received a  - brain death kidney transplant on 13 for ? MPGN/? MN (induction: Campath, CMV status: D+, R-,  bridged with heparin postoperatively for his history of antiphospholipid antibody syndrome) . Immunosuppression with Prograf and Cellcept. His baseline creatinine has been 1.0-1.3.       Interval history: patient has recently developed proteinuria of 4 gram for which his nephrologist contacted [facility] transplant for possible kidney biopsy. Patient underwent kidney allograft biopsy while he was bridged with heparin for his history of antiphospholipid syndrome at [facility] in 2017. His biopsy was consistent with membranous nephropathy and focal acute tubular injury           Patient is here for kidney biopsy result discussion. I answered all his questions bout the nature of disease and options of treatment.  As there is no lab results available in epic, I prefer to send patient for comprehensive blood work and urine test before initiation of any treatment today. he denies any chest pain, shortness of breath, ENT symptoms, nausea/vomiting, dysuria, frequency, urgency, or pain over the allograft.        Medications:    Current Outpatient Prescriptions   Medication Sig Dispense Refill    atorvastatin (LIPITOR) 40 MG tablet Take 1 tablet (40 mg total) by mouth once daily. 1 Tablet Oral Every day 30 tablet 5    benazepril (LOTENSIN) 10 MG tablet Take 10 mg by mouth every evening.       ERGOCALCIFEROL, VITAMIN D2, MISC 1.25 mg by Misc.(Non-Drug; Combo Route) route every 30 days.      esomeprazole (NEXIUM) 20 MG capsule Take 40 mg by mouth before breakfast.       levothyroxine (SYNTHROID) 50 MCG tablet Take 50 mcg by mouth once daily.      magnesium oxide (MAG-OX) 400 mg tablet Take 400 mg by mouth once daily.      multivitamin (THERAGRAN) tablet Take 1 tablet by mouth once daily. 30 tablet 0    mycophenolate (CELLCEPT) 250 mg Cap TAKE (2) CAPSULES TWICE DAILY. 120 capsule 11    tacrolimus (PROGRAF) 1 MG Cap Take 1 capsule (1 mg total) by mouth every 12 (twelve) hours. 60 capsule 11    warfarin (COUMADIN) 5 MG tablet Take 7.5 mg by mouth on Monday, Wed, Thursday, Saturday and Sunday evenings. Take 10 mg by mouth on Tuesday and Friday evening. 24 tablet 1    linagliptin (TRADJENTA) 5 mg Tab tablet Take 5 mg by mouth once daily.       No current facility-administered medications for this visit.            Review of Systems  Constitutional: Negative for fever, appetite change and fatigue.   HENT: Negative for hearing loss, sore throat and mouth sores.   Eyes: Negative for photophobia, pain and visual disturbance.   Respiratory: Negative for cough, chest tightness, shortness of breath and wheezing.   Cardiovascular: Negative for chest pain, palpitations and leg swelling.   Gastrointestinal: Negative for nausea, vomiting, abdominal pain, diarrhea, constipation, blood in stool and abdominal distention.   Genitourinary: Negative for dysuria, urgency, frequency, hematuria, decreased urine volume, difficulty urinating.   Musculoskeletal: Negative for back pain, joint swelling, arthralgias and gait problem.   Skin: Negative for pallor, rash and wound.   Neurological: Negative for dizziness, tremors, syncope, weakness, light-headedness and headaches.   Immunologic: immune suppressed  Hematological: Negative for adenopathy. Does not bruise/bleed easily.   Psychiatric/Behavioral: Negative for confusion,  sleep disturbance and dysphoric mood. The patient is not nervous/anxious.             Objective:   Blood pressure (!) 140/68, pulse (!) 57, temperature 97.7 °F (36.5 °C), temperature source Oral, resp. rate 16, height 6' (1.829 m), weight 101 kg (222 lb 10.6 oz), SpO2 99 %.body mass index is 30.2 kg/(m^2).    Physical Exam  General: No acute distress, well groomed  HEENT: Normocephalic, atraumatic,  moist mucous membranes, no oral ulcerations/lesions  Neck: Supple, symmetrical, trachea midline, no thyromegaly  Respiratory: Clear to auscultation bilaterally, respirations unlabored, no rales/rhonchi/wheezing  Cardiovacular: Regular rate and rhythm, S1, S2 normal, no murmurs,  No JVD, no carotid bruit  Gastrointestinal: Soft, non-tender, bowel sounds normal, no hepatosplenomegaly  Renal allograft exam: No tenderness, no bruits, normal exam  Musculoskeletal: No knee or ankle joint tenderness or swelling.   Extremities: No clubbing or cyanosis of bilateral upper extremities; + lower extremity edema bilaterally  Skin: warm and dry; no rash on exposed skin  Neurologic: CN grossly intact,  alert and oriented x 3      Labs:  Lab Results   Component Value Date    WBC 4.42 05/08/2017    HGB 14.6 05/08/2017    HCT 43.4 05/08/2017     05/08/2017    K 4.9 05/08/2017     05/08/2017    CO2 29 05/08/2017    BUN 22 (H) 05/08/2017    CREATININE 1.2 05/08/2017    EGFRNONAA >60.0 05/08/2017    CALCIUM 9.5 05/08/2017    PHOS 3.2 05/08/2017    MG 1.5 (L) 05/08/2017    ALBUMIN 2.6 (L) 05/08/2017    AST 43 (H) 07/21/2013    ALT 69 (H) 07/21/2013       Lab Results   Component Value Date    EXTANC  07/08/2015      Comment:      clinic 7/15/15    EXTWBC 4.2 03/27/2017    EXTSEGS 62 03/27/2017    EXTPLATELETS 97 03/27/2017    EXTHEMOGLOBI 15.0 03/27/2017    EXTHEMATOCRI 45 03/27/2017    EXTCREATININ 1.0 04/20/2017    EXTSODIUM 136 04/20/2017    EXTPOTASSIUM 4.6 04/20/2017    EXTBUN 16 04/20/2017    EXTCO2 25 04/20/2017    EXTCALCIUM  9.0 2017    EXTPHOSPHORU 2.8 2017    EXTGLUCOSE 189 (H) 2017    EXTALBUMIN 3.5 2017    EXTAST 23 2017    EXTALT 19 2017    EXTBILITOTAL 0.5 2017       Lab Results   Component Value Date    EXTSIROLIMUS 5.5 2016    EXTTACROLVL 4.0 2017    EXTPROTCRE 1.593 2017    EXTPTHINTACT 140 (H) 2017    EXTPROTEINUA neg 2014    EXTWBCUA neg 2014    EXTRBCUA NEG 2014       Labs were reviewed with the patient.    Assessment/Plan:     1. Membranous nephropathy determined by biopsy    2. Essential hypertension    3. Antiphospholipid antibody syndrome on indefinite anticoagulation    4. Secondary hyperparathyroidism of renal origin    5. Chronic immunosuppression with Prograf and Cellcept    6. 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 Burgess is a 57 y.o. male with:     # History of ESRD presumed secondary to ? MPGN/ ? MN s/p DDKT in   - baseline Cr 1.0 to 1.3  - last Cr 1.0 from 2017  - UPC ratio reportedly : 4 gram  - Pending UPC ratio from today      # Proteinuria  - Kidney biopsy : MN and focal acute tubular injury. Requested native kidney biopsy from nephrology office, they don't have it. It is not clear whether this MN is de sara or recurrent.    - Pending serum PLA2R  - Will request malignancy screening, r/o active infection before initiation of treatment ( acute hepatitis panel, HIV,RPR, PSA, report of recent colonoscopy, native kidney US   - Might consider rituximab     # Immunosuppression:   - continue Prograf , no recent prograf level   - continue MMF mg BID   - will request his labs and prograf level from his nephrologist office  - will monitor closely for toxicities    # Infectious Surveillance:   - pending CMV and BK from today     # HTN:   - BPs well controlled   - continue with home blood pressure monitoring  - low salt and healthy life discussed with the patient    # Metabolic Bone Disease/Secondary  Hyperparathyroidism:  - pending labs    # Anemia of chronic disease:   - pending CBC      Follow-up:   In 2 weeks    Sherrie Castillo MD       Education:   Material provided to the patient.  Patient reminded to call with any health changes since these can be early signs of significant complications.  Also, I advised the patient to be sure any new medications or changes of old medications are discussed with either a pharmacist or physician knowledgeable with transplant to avoid rejection/drug toxicity related to significant drug interactions.    UNOS Patient Status  Functional Status: 90% - Able to carry on normal activity: minor symptoms of disease  Physical Capacity: No Limitations

## 2017-05-10 ENCOUNTER — TELEPHONE (OUTPATIENT)
Dept: TRANSPLANT | Facility: CLINIC | Age: 57
End: 2017-05-10

## 2017-05-10 LAB
ANCA AB TITR SER IF: NORMAL TITER
P-ANCA TITR SER IF: NORMAL TITER
PHOSPHOLIPASE A2 RECEPTOR, ELISA: 39 RU/ML
PHOSPHOLIPASE A2 RECEPTOR, IFA: POSITIVE

## 2017-05-10 NOTE — PROGRESS NOTES
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 We will await results of tests recently ordered, but plan on giving you rituximab if it all comes back acceptable. We can have you come back to clinic asap to discuss and schedule treatment. We will also follow this level in your blood every month to ensure it resolves as a means of making sure this is resolved.    Plan-rituxan 1 gram IV q 2 weeks apart x 2 doses with appropriate prophylaxis.

## 2017-05-10 NOTE — TELEPHONE ENCOUNTER
Discussed with patient and wife need for further lab testing, patient stating he can have it done today.  This writer will fax to lab.    Also discussed the need for colonoscopy report and native kidney ultrasound report.  Wife reporting that she is unsure where these test were done, but will ask general nephrologist.

## 2017-05-11 RX ORDER — ACETAMINOPHEN 325 MG/1
650 TABLET ORAL
Status: CANCELLED | OUTPATIENT
Start: 2017-05-15

## 2017-05-11 RX ORDER — HEPARIN 100 UNIT/ML
500 SYRINGE INTRAVENOUS
Status: CANCELLED | OUTPATIENT
Start: 2017-05-15

## 2017-05-11 RX ORDER — FAMOTIDINE 10 MG/ML
20 INJECTION INTRAVENOUS
Status: CANCELLED | OUTPATIENT
Start: 2017-05-15

## 2017-05-11 RX ORDER — SODIUM CHLORIDE 0.9 % (FLUSH) 0.9 %
10 SYRINGE (ML) INJECTION
Status: CANCELLED | OUTPATIENT
Start: 2017-05-15

## 2017-05-15 ENCOUNTER — TELEPHONE (OUTPATIENT)
Dept: TRANSPLANT | Facility: CLINIC | Age: 57
End: 2017-05-15

## 2017-05-15 NOTE — TELEPHONE ENCOUNTER
Returned call to Karie regarding Mr. Zendejas's.  She reports that patient's insurance will not cover with current ICD 10 codes.  Will consult with transplant pharm D and contact Karie.

## 2017-05-15 NOTE — TELEPHONE ENCOUNTER
----- Message from Heather Marquez sent at 5/15/2017  3:39 PM CDT -----  Contact: Karie [phone]  Nephrology  Called regarding pt's Rituxin treatment, please call [phone] will be there until 4:30

## 2017-05-16 PROBLEM — N02.2 MEMBRANOUS NEPHROPATHY DETERMINED BY BIOPSY: Status: ACTIVE | Noted: 2017-05-16

## 2017-05-17 ENCOUNTER — TELEPHONE (OUTPATIENT)
Dept: TRANSPLANT | Facility: CLINIC | Age: 57
End: 2017-05-17

## 2017-05-17 NOTE — TELEPHONE ENCOUNTER
Contact made to patient/ spoke to patient's wife regarding need for Rituxan treatment here at [facility] as there has been issues with insurance coverage.  Wife wants to discuss with patient and give this writer a call regarding ability to schedule here in [place] and when.  Message forwarded to MD.

## 2017-05-18 ENCOUNTER — PATIENT MESSAGE (OUTPATIENT)
Dept: TRANSPLANT | Facility: CLINIC | Age: 57
End: 2017-05-18

## 2017-05-18 NOTE — PATIENT INSTRUCTIONS
Hi,  I saw your message you wish to wait on rituxan until you get your painful arm evaluated. I am sorry it is bothering you so much. We will schedule the infusion when you are ready. However, you need to be aware the longer you wait for treatment, the harder it will be to see a good response to the membranous glomerulonephritis. Rituxan is a very weak chemotherapy medicine, and I really do NOT think you getting rituxan would affect your ability to take care of your arm.     In fact, I have another suggestion: see the surgeon who put the access in. He will be the one to tell you if you need surgery and whether Rituxan would be an issue for an operation. When anyone comes to me or any of our nephrologists, we defer to vascular surgeon to decide best treatment. The only exception is if there is an infection that requires immediate treatment. So, if the arm is red, hot or tender to touch, it could be infected and should be evaluated now either in ER or the surgeon's office.    Hope you feel better soon.

## 2017-05-25 ENCOUNTER — DOCUMENTATION ONLY (OUTPATIENT)
Dept: TRANSPLANT | Facility: CLINIC | Age: 57
End: 2017-05-25

## 2017-05-25 LAB
EXT HEP B S AG: NON REACTIVE
EXT HIV: NONREACTIVE
EXT PSA: 1.08 (ref 0–4)

## 2017-06-09 ENCOUNTER — TELEPHONE (OUTPATIENT)
Dept: TRANSPLANT | Facility: CLINIC | Age: 57
End: 2017-06-09

## 2017-06-09 NOTE — TELEPHONE ENCOUNTER
This writer contacted patient to follow up and receive update regarding patient's decision to postpone Rituxan treatment as recommended by transplant nephrologist after transplant.    Patient reports that he has an appt with vascular on Monday and he will be deciding when to have his fistula removed.     Reminded patient that postponing Rituxan treatment may lessen the affects of the treatment. Patient verbalized understanding and states he will call and update transplant team when he decides to proceed with planned treatment.      Message forwarded to MD.

## 2017-06-16 ENCOUNTER — TELEPHONE (OUTPATIENT)
Dept: TRANSPLANT | Facility: CLINIC | Age: 57
End: 2017-06-16

## 2017-06-16 NOTE — TELEPHONE ENCOUNTER
Received call from patient's wife, stating that Mr. Burgess will be having surgery on 6/28 to remove fistula (Dr. Anthony Stubbs is the vascular surgeon - [place], -[phone]) and wants to schedule Rituxan for 7/6 and 7/20 as previously ordered.      Wife is requesting [facility] room on night prior to treatment and night of treatment.  Will forward request for [facility] to social workers and discuss scheduling treatment with nephrologist as wife does have some questions for MD.

## 2017-06-26 ENCOUNTER — TELEPHONE (OUTPATIENT)
Dept: TRANSPLANT | Facility: CLINIC | Age: 57
End: 2017-06-26

## 2017-06-26 NOTE — TELEPHONE ENCOUNTER
----- Message from Alexandra Martinez sent at 6/26/2017 11:18 AM CDT -----  Contact: wife  Wants to know if his infusions were scheduled please call her asap @ # [phone].

## 2017-06-26 NOTE — TELEPHONE ENCOUNTER
This writer contacted vascular surgeon's office to request clearance after surgery for Mr. Burgess to proceed with Rituxan treatment.  Left fax # with nurse.

## 2017-06-26 NOTE — TELEPHONE ENCOUNTER
Attempted returned call to patient's caregiver/wife.  Left detailed message as previously agreed upon.  Infusion is not scheduled, awaiting clearance from surgeon after fistula removed and colonoscopy report.

## 2017-07-03 ENCOUNTER — PATIENT MESSAGE (OUTPATIENT)
Dept: TRANSPLANT | Facility: CLINIC | Age: 57
End: 2017-07-03

## 2017-07-13 ENCOUNTER — TELEPHONE (OUTPATIENT)
Dept: TRANSPLANT | Facility: CLINIC | Age: 57
End: 2017-07-13

## 2017-07-13 NOTE — TELEPHONE ENCOUNTER
Patient contacted, spoke to wife, Mr. Burgess was present and available during the conversation.  This writer following up with patient regarding proceeding with Rituxan treatment proposed after kidney biopsy.  Patient opted to postpone surgery until fistula was removed.  Dr. Stubbs completed surgery on 6/28, patient had follow up on 7/10.  Wife and patient reporting they are still concerned with the amount of swelling in the arm.  Dr. Stubbs will do an ultrasound in a month.  This writer advised wife/patient that Dr. Stubbs did send over a note stating there was not infection present during surgery, they verbalized understanding, but is concerned with swelling.      Patient also advised of need for colonoscopy, and has not been able to get it scheduled.    MD contacted and advised of patient's concern, colonoscopy have not been completed since 2009.  MD advised colonoscopy needed prior to proceeding with Rituxan.  Patient contacted and verbalized understanding, states he will have the colonoscopy scheduled and contact this writer when it is scheduled.

## 2017-07-17 RX ORDER — TACROLIMUS 1 MG/1
CAPSULE ORAL
Qty: 60 CAPSULE | Refills: 11 | Status: SHIPPED | OUTPATIENT
Start: 2017-07-17 | End: 2017-12-20 | Stop reason: DRUGHIGH

## 2017-07-18 ENCOUNTER — PATIENT MESSAGE (OUTPATIENT)
Dept: TRANSPLANT | Facility: CLINIC | Age: 57
End: 2017-07-18

## 2017-08-04 ENCOUNTER — TELEPHONE (OUTPATIENT)
Dept: TRANSPLANT | Facility: CLINIC | Age: 57
End: 2017-08-04

## 2017-08-04 ENCOUNTER — PATIENT MESSAGE (OUTPATIENT)
Dept: TRANSPLANT | Facility: CLINIC | Age: 57
End: 2017-08-04

## 2017-08-04 NOTE — TELEPHONE ENCOUNTER
----- Message from Dutch Sorensen sent at 8/4/2017  1:37 PM CDT -----  Contact: Mrs gomez   Calling to give you more info. Please call

## 2017-08-10 ENCOUNTER — TELEPHONE (OUTPATIENT)
Dept: TRANSPLANT | Facility: CLINIC | Age: 57
End: 2017-08-10

## 2017-08-10 RX ORDER — DIPHENHYDRAMINE HCL 25 MG
25 CAPSULE ORAL ONCE
Status: CANCELLED
Start: 2017-08-24 | End: 2017-08-24

## 2017-08-10 RX ORDER — ACETAMINOPHEN 325 MG/1
650 TABLET ORAL
Status: CANCELLED | OUTPATIENT
Start: 2017-08-24

## 2017-08-10 NOTE — TELEPHONE ENCOUNTER
Call placed to Mrs. Burgess after discussing colonoscopy report received with Dr. Castillo.  MD advises okay to proceed with planned Rituxan treatment.  Wife would like to come in the week of 8/21.  Call placed to Chemo infusion, advised of need for appt on 8/24 and 9/7.  Nurse to call this coordinator back to get patient on schedule.

## 2017-08-13 ENCOUNTER — PATIENT MESSAGE (OUTPATIENT)
Dept: TRANSPLANT | Facility: CLINIC | Age: 57
End: 2017-08-13

## 2017-08-15 ENCOUNTER — PATIENT MESSAGE (OUTPATIENT)
Dept: TRANSPLANT | Facility: CLINIC | Age: 57
End: 2017-08-15

## 2017-08-15 ENCOUNTER — TELEPHONE (OUTPATIENT)
Dept: TRANSPLANT | Facility: CLINIC | Age: 57
End: 2017-08-15

## 2017-08-15 NOTE — TELEPHONE ENCOUNTER
"KASSI received the following message from coordinator:     "Please arrange [facility] stay(previously approved) for Mr. Zendejas's Rituxan infusion. He is scheduled for his 1st infusion on 8/24. He'll need to come in 8/23 and check out on 8/25. His next infusion is scheduled for 9/7, He'll need to check in on 9/6 and check out on 9/8. "    KASSI communicated with [facility] , who reports hotel availability for the dates above. KASSI contacted pt and wife who report understanding pt will need to provide compete and return financial profile to SW. Pt's wife reports understanding couple will be responsible for nightly rate if financial profile is not returned.     KASSI emailed reservations request to [facility] and received confirmation of receipt with . KASSI notified coordinator.     "

## 2017-08-24 ENCOUNTER — DOCUMENTATION ONLY (OUTPATIENT)
Dept: TRANSPLANT | Facility: CLINIC | Age: 57
End: 2017-08-24

## 2017-08-24 ENCOUNTER — TELEPHONE (OUTPATIENT)
Dept: TRANSPLANT | Facility: CLINIC | Age: 57
End: 2017-08-24

## 2017-08-24 ENCOUNTER — INFUSION (OUTPATIENT)
Dept: INFUSION THERAPY | Facility: HOSPITAL | Age: 57
End: 2017-08-24
Attending: INTERNAL MEDICINE
Payer: MEDICARE

## 2017-08-24 VITALS
TEMPERATURE: 98 F | WEIGHT: 225 LBS | HEART RATE: 64 BPM | HEIGHT: 71 IN | SYSTOLIC BLOOD PRESSURE: 132 MMHG | DIASTOLIC BLOOD PRESSURE: 62 MMHG | BODY MASS INDEX: 31.5 KG/M2 | RESPIRATION RATE: 16 BRPM

## 2017-08-24 DIAGNOSIS — D68.61 ANTIPHOSPHOLIPID ANTIBODY SYNDROME: ICD-10-CM

## 2017-08-24 DIAGNOSIS — N02.2 MEMBRANOUS NEPHROPATHY DETERMINED BY BIOPSY: Primary | ICD-10-CM

## 2017-08-24 DIAGNOSIS — Z94.0 KIDNEY REPLACED BY TRANSPLANT: Primary | ICD-10-CM

## 2017-08-24 LAB
EXT ALBUMIN: 1.8 (ref 3.5–5)
EXT ALKALINE PHOSPHATASE: 137 (ref 38–126)
EXT ALT: 17
EXT AST: 22
EXT BILIRUBIN TOTAL: 0.2
EXT BUN: 16
EXT CALCIUM: 8 (ref 8.4–40.2)
EXT CHLORIDE: 100
EXT CO2: 26
EXT CREATININE: 1.1 MG/DL
EXT EOSINOPHIL%: 2
EXT GLUCOSE: 132
EXT HEMATOCRIT: 43
EXT HEMOGLOBIN: 14.1
EXT LYMPH%: 19
EXT MONOCYTES%: 12
EXT PHOSPHORUS: 3
EXT PLATELETS: 149
EXT POTASSIUM: 5.2 (ref 3.5–5.1)
EXT SEGS%: 65
EXT SODIUM: 134 MMOL/L
EXT WBC: 4.2

## 2017-08-24 PROCEDURE — 96413 CHEMO IV INFUSION 1 HR: CPT

## 2017-08-24 PROCEDURE — 63600175 PHARM REV CODE 636 W HCPCS: Performed by: INTERNAL MEDICINE

## 2017-08-24 PROCEDURE — S0028 INJECTION, FAMOTIDINE, 20 MG: HCPCS | Performed by: INTERNAL MEDICINE

## 2017-08-24 PROCEDURE — 25000003 PHARM REV CODE 250: Performed by: INTERNAL MEDICINE

## 2017-08-24 PROCEDURE — 96375 TX/PRO/DX INJ NEW DRUG ADDON: CPT

## 2017-08-24 PROCEDURE — 63600175 PHARM REV CODE 636 W HCPCS

## 2017-08-24 PROCEDURE — 96367 TX/PROPH/DG ADDL SEQ IV INF: CPT

## 2017-08-24 PROCEDURE — 96415 CHEMO IV INFUSION ADDL HR: CPT

## 2017-08-24 RX ORDER — FAMOTIDINE 10 MG/ML
20 INJECTION INTRAVENOUS 2 TIMES DAILY
Status: DISCONTINUED | OUTPATIENT
Start: 2017-08-24 | End: 2017-08-24 | Stop reason: HOSPADM

## 2017-08-24 RX ORDER — ACETAMINOPHEN 325 MG/1
650 TABLET ORAL
Status: CANCELLED | OUTPATIENT
Start: 2017-08-24

## 2017-08-24 RX ORDER — MEPERIDINE HYDROCHLORIDE 50 MG/ML
25 INJECTION INTRAMUSCULAR; INTRAVENOUS; SUBCUTANEOUS
Status: COMPLETED | OUTPATIENT
Start: 2017-08-24 | End: 2017-08-24

## 2017-08-24 RX ORDER — MEPERIDINE HYDROCHLORIDE 50 MG/ML
INJECTION INTRAMUSCULAR; INTRAVENOUS; SUBCUTANEOUS
Status: COMPLETED
Start: 2017-08-24 | End: 2017-08-24

## 2017-08-24 RX ORDER — ACETAMINOPHEN 325 MG/1
650 TABLET ORAL
Status: COMPLETED | OUTPATIENT
Start: 2017-08-24 | End: 2017-08-24

## 2017-08-24 RX ORDER — DIPHENHYDRAMINE HCL 25 MG
25 CAPSULE ORAL ONCE
Status: CANCELLED
Start: 2017-08-24 | End: 2017-08-24

## 2017-08-24 RX ORDER — SODIUM CHLORIDE 0.9 % (FLUSH) 0.9 %
10 SYRINGE (ML) INJECTION
Status: DISCONTINUED | OUTPATIENT
Start: 2017-08-24 | End: 2017-08-24 | Stop reason: HOSPADM

## 2017-08-24 RX ADMIN — DIPHENHYDRAMINE HYDROCHLORIDE 50 MG: 50 INJECTION, SOLUTION INTRAMUSCULAR; INTRAVENOUS at 09:08

## 2017-08-24 RX ADMIN — MEPERIDINE HYDROCHLORIDE 25 MG: 50 INJECTION INTRAMUSCULAR; INTRAVENOUS; SUBCUTANEOUS at 11:08

## 2017-08-24 RX ADMIN — FAMOTIDINE 20 MG: 10 INJECTION INTRAVENOUS at 09:08

## 2017-08-24 RX ADMIN — ACETAMINOPHEN 650 MG: 325 TABLET ORAL at 09:08

## 2017-08-24 RX ADMIN — RITUXIMAB 1000 MG: 10 INJECTION, SOLUTION INTRAVENOUS at 09:08

## 2017-08-24 NOTE — NURSING
Notified by pt's wife; pt experiencing dry throat, chills and jitteriness. Rituxan stopped and MD notified. Demerol 25 mg IVP ordered and administered. VSS. Will monitor.

## 2017-08-24 NOTE — PLAN OF CARE
Problem: Patient Care Overview  Goal: Plan of Care Review  Outcome: Ongoing (interventions implemented as appropriate)  Pt arrived for 1st dose of Rituxan. Pt oriented to infusion suite and offered snacks and blankets. PIV started to right AC. Information about Rituxan given to pt and wife. Consent signed this AM. Notified Dr Castillo's nurse regarding premeds; IV benadryl and IV pepcid ordered in addition to po tylenol. VSS; NAD. Will monitor.

## 2017-08-24 NOTE — TELEPHONE ENCOUNTER
----- Message from Lito Perry RN sent at 8/24/2017  2:31 PM CDT -----      ----- Message -----  From: Sherrie Castillo MD  Sent: 8/24/2017  12:49 PM  To: [facility] Post-Kidney Transplant Clinical    High K: please low K diet and repeat K on Monday  Corrected Ca is 9.8.   Hypoalbuminemia due to MN. S/p rituximab today. Will monitor Alb     Thank you!

## 2017-08-24 NOTE — PLAN OF CARE
Problem: Patient Care Overview  Goal: Plan of Care Review  Outcome: Ongoing (interventions implemented as appropriate)  Pt completed Rituximab IV infusion. Pt did not experience any side effects post demerol administration. Rituxan titrated to 400 mg/hr without incident. Pt's VSS; NAD. PIV removed. Discharging with wife at side.

## 2017-08-24 NOTE — PROGRESS NOTES
High K: please low K diet and repeat K on Monday  Corrected Ca is 9.8.   Hypoalbuminemia due to MN. S/p rituximab today. Will monitor Alb     Thank you!

## 2017-08-25 ENCOUNTER — LAB VISIT (OUTPATIENT)
Dept: LAB | Facility: HOSPITAL | Age: 57
End: 2017-08-25
Attending: INTERNAL MEDICINE
Payer: MEDICARE

## 2017-08-25 ENCOUNTER — TELEPHONE (OUTPATIENT)
Dept: TRANSPLANT | Facility: CLINIC | Age: 57
End: 2017-08-25

## 2017-08-25 ENCOUNTER — PATIENT MESSAGE (OUTPATIENT)
Dept: TRANSPLANT | Facility: CLINIC | Age: 57
End: 2017-08-25

## 2017-08-25 DIAGNOSIS — Z94.0 KIDNEY REPLACED BY TRANSPLANT: ICD-10-CM

## 2017-08-25 LAB
ALBUMIN SERPL BCP-MCNC: 1.3 G/DL
ANION GAP SERPL CALC-SCNC: 3 MMOL/L
BASOPHILS # BLD AUTO: 0.01 K/UL
BASOPHILS NFR BLD: 0.2 %
BUN SERPL-MCNC: 19 MG/DL
CALCIUM SERPL-MCNC: 7.9 MG/DL
CHLORIDE SERPL-SCNC: 106 MMOL/L
CO2 SERPL-SCNC: 26 MMOL/L
CREAT SERPL-MCNC: 1.1 MG/DL
DIFFERENTIAL METHOD: ABNORMAL
EOSINOPHIL # BLD AUTO: 0.1 K/UL
EOSINOPHIL NFR BLD: 2.3 %
ERYTHROCYTE [DISTWIDTH] IN BLOOD BY AUTOMATED COUNT: 13.7 %
EST. GFR  (AFRICAN AMERICAN): >60 ML/MIN/1.73 M^2
EST. GFR  (NON AFRICAN AMERICAN): >60 ML/MIN/1.73 M^2
GLUCOSE SERPL-MCNC: 156 MG/DL
HCT VFR BLD AUTO: 42.9 %
HGB BLD-MCNC: 15 G/DL
LYMPHOCYTES # BLD AUTO: 0.7 K/UL
LYMPHOCYTES NFR BLD: 14.2 %
MAGNESIUM SERPL-MCNC: 1.6 MG/DL
MCH RBC QN AUTO: 31 PG
MCHC RBC AUTO-ENTMCNC: 35 G/DL
MCV RBC AUTO: 89 FL
MONOCYTES # BLD AUTO: 0.5 K/UL
MONOCYTES NFR BLD: 9.7 %
NEUTROPHILS # BLD AUTO: 3.7 K/UL
NEUTROPHILS NFR BLD: 73 %
PHOSPHATE SERPL-MCNC: 2.6 MG/DL
PLATELET # BLD AUTO: 109 K/UL
PMV BLD AUTO: 9.8 FL
POTASSIUM SERPL-SCNC: 4.9 MMOL/L
RBC # BLD AUTO: 4.84 M/UL
SODIUM SERPL-SCNC: 135 MMOL/L
TACROLIMUS BLD-MCNC: 4.1 NG/ML
WBC # BLD AUTO: 5.13 K/UL

## 2017-08-25 PROCEDURE — 83735 ASSAY OF MAGNESIUM: CPT

## 2017-08-25 PROCEDURE — 36415 COLL VENOUS BLD VENIPUNCTURE: CPT

## 2017-08-25 PROCEDURE — 85025 COMPLETE CBC W/AUTO DIFF WBC: CPT

## 2017-08-25 PROCEDURE — 80197 ASSAY OF TACROLIMUS: CPT

## 2017-08-25 PROCEDURE — 80069 RENAL FUNCTION PANEL: CPT

## 2017-08-25 NOTE — TELEPHONE ENCOUNTER
Call placed to patient, discussed labs resulted and reviewed per MD.  Patient verbalized understanding.  He reported some calf pain, he was advised to contact his vascular surgeon, patient states he will.  All questions answered.  Labs planned for Monday.

## 2017-08-25 NOTE — TELEPHONE ENCOUNTER
SW spoke with pt's wife to review storm prep in advance of [place].  Wife stated they are no longer staying at  and have returned to home to [place].  No needs identified.

## 2017-08-28 ENCOUNTER — PATIENT MESSAGE (OUTPATIENT)
Dept: TRANSPLANT | Facility: CLINIC | Age: 57
End: 2017-08-28

## 2017-08-30 ENCOUNTER — DOCUMENTATION ONLY (OUTPATIENT)
Dept: TRANSPLANT | Facility: CLINIC | Age: 57
End: 2017-08-30

## 2017-08-30 LAB
EXT ALBUMIN: 2
EXT BUN: 22
EXT CALCIUM: 8.5
EXT CHLORIDE: 97
EXT CO2: 27
EXT CREATININE: 1.1 MG/DL
EXT EOSINOPHIL%: 2
EXT GFR MDRD AF AMER: 69
EXT GLUCOSE: 147
EXT HEMATOCRIT: 46.6
EXT HEMOGLOBIN: 15.7
EXT LYMPH%: 18
EXT MAGNESIUM: 1.6 (ref 1.8–2.9)
EXT MONOCYTES%: 9
EXT PHOSPHORUS: 3.1
EXT PLATELETS: 140
EXT POTASSIUM: 4.7
EXT PROT/CREAT RATIO UR: ABNORMAL
EXT SEGS%: 72
EXT SODIUM: 131 MMOL/L (ref 135–145)
EXT TACROLIMUS LVL: ABNORMAL
EXT WBC: 5.8

## 2017-09-07 ENCOUNTER — TELEPHONE (OUTPATIENT)
Dept: TRANSPLANT | Facility: CLINIC | Age: 57
End: 2017-09-07

## 2017-09-07 ENCOUNTER — DOCUMENTATION ONLY (OUTPATIENT)
Dept: TRANSPLANT | Facility: CLINIC | Age: 57
End: 2017-09-07

## 2017-09-07 ENCOUNTER — INFUSION (OUTPATIENT)
Dept: INFUSION THERAPY | Facility: HOSPITAL | Age: 57
End: 2017-09-07
Attending: INTERNAL MEDICINE
Payer: MEDICARE

## 2017-09-07 VITALS
HEART RATE: 61 BPM | DIASTOLIC BLOOD PRESSURE: 75 MMHG | SYSTOLIC BLOOD PRESSURE: 153 MMHG | RESPIRATION RATE: 18 BRPM | TEMPERATURE: 98 F

## 2017-09-07 DIAGNOSIS — D68.61 ANTIPHOSPHOLIPID ANTIBODY SYNDROME: ICD-10-CM

## 2017-09-07 DIAGNOSIS — Z94.0 KIDNEY REPLACED BY TRANSPLANT: Primary | ICD-10-CM

## 2017-09-07 DIAGNOSIS — N02.2 MEMBRANOUS NEPHROPATHY DETERMINED BY BIOPSY: ICD-10-CM

## 2017-09-07 LAB
EXT ALBUMIN: 2
EXT ALBUMIN: 2
EXT ALKALINE PHOSPHATASE: NORMAL
EXT ALT: NORMAL
EXT AMYLASE: NORMAL
EXT ANC: NORMAL
EXT AST: NORMAL
EXT BACTERIA UA: NORMAL
EXT BANDS%: NORMAL
EXT BILIRUBIN DIRECT: NORMAL MG/DL
EXT BILIRUBIN TOTAL: NORMAL
EXT BK VIRUS DNA QN PCR: NORMAL
EXT BK VIRUS DNA QUANT, PCR, URINE: NORMAL
EXT BUN: 16
EXT BUN: 22
EXT C PEPTIDE: NORMAL
EXT CALCIUM: 8.4
EXT CALCIUM: 8.5
EXT CHLORIDE: 97
EXT CHLORIDE: 99
EXT CHOLESTEROL (LIPID PANEL): NORMAL
EXT CHOLESTEROL: NORMAL
EXT CMV DNA QUANT. BY PCR: NORMAL
EXT CO2: 25
EXT CO2: 27
EXT CREATININE: 1 MG/DL
EXT CREATININE: 1.1 MG/DL
EXT CYCLOSPORINE LVL: NORMAL
EXT EBV DNA BY PCR: NORMAL
EXT EBV DNA-COPIES/ML: NORMAL
EXT EOSINOPHIL%: 2
EXT EOSINOPHIL%: 3
EXT FERRITIN: NORMAL
EXT GFR MDRD AF AMER: NORMAL
EXT GFR MDRD NON AF AMER: 69
EXT GFR MDRD NON AF AMER: 77
EXT GLUCOSE UA: NORMAL
EXT GLUCOSE: 147
EXT GLUCOSE: 152
EXT HBV DNA QUANT PCR: NORMAL
EXT HCV QUANT: NORMAL
EXT HDL: NORMAL
EXT HEMATOCRIT: 44.4
EXT HEMATOCRIT: 46.6
EXT HEMOGLOBIN A1C: NORMAL
EXT HEMOGLOBIN: 15.7
EXT HEMOGLOBIN: 15.7
EXT HEP B S AG: NORMAL
EXT HIV: NORMAL
EXT IMMUNKNOW (STIMULATED): NORMAL
EXT INR: NORMAL
EXT IRON SATURATION: NORMAL
EXT LDH, TOTAL: NORMAL
EXT LDL CHOLESTEROL: NORMAL
EXT LIPASE: NORMAL
EXT LYMPH%: 13
EXT LYMPH%: 18
EXT MAGNESIUM: 1.6
EXT MAGNESIUM: 1.7
EXT MONOCYTES%: 8
EXT MONOCYTES%: 9
EXT NITRITES UA: NORMAL
EXT PHOSPHORUS: 2.9
EXT PHOSPHORUS: 3.1
EXT PLATELETS: 140
EXT PLATELETS: 164
EXT POTASSIUM: 4.7
EXT POTASSIUM: 4.7
EXT PROT/CREAT RATIO UR: 0.98
EXT PROT/CREAT RATIO UR: NORMAL
EXT PROTEIN TOTAL: NORMAL
EXT PROTEIN UA: NORMAL
EXT PT: NORMAL
EXT PTH, INTACT: NORMAL
EXT RBC UA: NORMAL
EXT SEGS%: 72
EXT SEGS%: 75
EXT SERUM IRON: NORMAL
EXT SIROLIMUS LVL: NORMAL
EXT SODIUM: 131 MMOL/L
EXT SODIUM: 134 MMOL/L
EXT STOOL CDIFF: NORMAL
EXT STOOL CMV: NORMAL
EXT STOOL CULTURE: NORMAL
EXT STOOL OCP: NORMAL
EXT TACROLIMUS LVL: 4.2
EXT TACROLIMUS LVL: 5
EXT TIBC: NORMAL
EXT TRIGLYCERIDES: NORMAL
EXT UNSATURATED IRON BINDING CAP.: NORMAL
EXT URIC ACID: NORMAL
EXT URINE CULTURE: NORMAL
EXT VIT D 25 HYDROXY: NORMAL
EXT WBC UA: NORMAL
EXT WBC: 5.8
EXT WBC: 5.8

## 2017-09-07 PROCEDURE — 25000003 PHARM REV CODE 250: Performed by: INTERNAL MEDICINE

## 2017-09-07 PROCEDURE — 63600175 PHARM REV CODE 636 W HCPCS: Performed by: INTERNAL MEDICINE

## 2017-09-07 PROCEDURE — 96413 CHEMO IV INFUSION 1 HR: CPT

## 2017-09-07 PROCEDURE — S0028 INJECTION, FAMOTIDINE, 20 MG: HCPCS | Performed by: INTERNAL MEDICINE

## 2017-09-07 PROCEDURE — 96375 TX/PRO/DX INJ NEW DRUG ADDON: CPT

## 2017-09-07 PROCEDURE — 96367 TX/PROPH/DG ADDL SEQ IV INF: CPT

## 2017-09-07 PROCEDURE — 96415 CHEMO IV INFUSION ADDL HR: CPT

## 2017-09-07 RX ORDER — ACETAMINOPHEN 325 MG/1
650 TABLET ORAL
Status: CANCELLED | OUTPATIENT
Start: 2017-09-07

## 2017-09-07 RX ORDER — DIPHENHYDRAMINE HCL 25 MG
25 CAPSULE ORAL ONCE
Status: CANCELLED
Start: 2017-09-07 | End: 2017-09-07

## 2017-09-07 RX ORDER — FAMOTIDINE 10 MG/ML
20 INJECTION INTRAVENOUS ONCE
Status: COMPLETED | OUTPATIENT
Start: 2017-09-07 | End: 2017-09-07

## 2017-09-07 RX ORDER — ACETAMINOPHEN 325 MG/1
650 TABLET ORAL
Status: COMPLETED | OUTPATIENT
Start: 2017-09-07 | End: 2017-09-07

## 2017-09-07 RX ADMIN — SODIUM CHLORIDE: 0.9 INJECTION, SOLUTION INTRAVENOUS at 09:09

## 2017-09-07 RX ADMIN — ACETAMINOPHEN 650 MG: 325 TABLET ORAL at 09:09

## 2017-09-07 RX ADMIN — DIPHENHYDRAMINE HYDROCHLORIDE 50 MG: 50 INJECTION, SOLUTION INTRAMUSCULAR; INTRAVENOUS at 09:09

## 2017-09-07 RX ADMIN — RITUXIMAB 1000 MG: 10 INJECTION, SOLUTION INTRAVENOUS at 09:09

## 2017-09-07 RX ADMIN — FAMOTIDINE 20 MG: 10 INJECTION INTRAVENOUS at 09:09

## 2017-09-07 NOTE — TELEPHONE ENCOUNTER
Patient arrived to transplant clinic this AM,  contacted this writer.    Patient directed to go to 5th floor of cancer center for scheduled Rituxan. Reviewed the plan for labs in the AM with patient, this writer will follow up this afternoon.

## 2017-09-07 NOTE — PLAN OF CARE
Problem: Patient Care Overview  Goal: Individualization & Mutuality  Outcome: Ongoing (interventions implemented as appropriate)  0840-Labs , hx, and medications reviewed, patient reports discomfort when walking following last infusion but MD aware, office was contacted this AM and orders received for standard rituxan premeds. Assessment completed. Discussed plan of care with patient. Patient in agreement. Chair reclined and warm blanket and snack offered.

## 2017-09-07 NOTE — PLAN OF CARE
Problem: Patient Care Overview  Goal: Plan of Care Review  1420-Rituxan started at rate of 50 cc/hr and increased by 50 cc/hr every 30 minutes to a max rate of 400 cc/hr. Patient tolerated treatment well. Discharged without complaints or S/S of adverse event. AVS given.  Instructed to call provider for any questions or concerns.

## 2017-09-08 ENCOUNTER — LAB VISIT (OUTPATIENT)
Dept: LAB | Facility: HOSPITAL | Age: 57
End: 2017-09-08
Attending: INTERNAL MEDICINE
Payer: MEDICARE

## 2017-09-08 ENCOUNTER — TELEPHONE (OUTPATIENT)
Dept: TRANSPLANT | Facility: CLINIC | Age: 57
End: 2017-09-08

## 2017-09-08 DIAGNOSIS — Z94.0 KIDNEY REPLACED BY TRANSPLANT: ICD-10-CM

## 2017-09-08 LAB
ALBUMIN SERPL BCP-MCNC: 1.3 G/DL
ANION GAP SERPL CALC-SCNC: 8 MMOL/L
BASOPHILS # BLD AUTO: 0.01 K/UL
BASOPHILS NFR BLD: 0.2 %
BUN SERPL-MCNC: 19 MG/DL
CALCIUM SERPL-MCNC: 8.1 MG/DL
CHLORIDE SERPL-SCNC: 104 MMOL/L
CO2 SERPL-SCNC: 23 MMOL/L
CREAT SERPL-MCNC: 1 MG/DL
DIFFERENTIAL METHOD: ABNORMAL
EOSINOPHIL # BLD AUTO: 0.1 K/UL
EOSINOPHIL NFR BLD: 2 %
ERYTHROCYTE [DISTWIDTH] IN BLOOD BY AUTOMATED COUNT: 13.7 %
EST. GFR  (AFRICAN AMERICAN): >60 ML/MIN/1.73 M^2
EST. GFR  (NON AFRICAN AMERICAN): >60 ML/MIN/1.73 M^2
GLUCOSE SERPL-MCNC: 145 MG/DL
HCT VFR BLD AUTO: 41.6 %
HGB BLD-MCNC: 14.7 G/DL
LYMPHOCYTES # BLD AUTO: 1.1 K/UL
LYMPHOCYTES NFR BLD: 17.9 %
MAGNESIUM SERPL-MCNC: 1.6 MG/DL
MCH RBC QN AUTO: 30 PG
MCHC RBC AUTO-ENTMCNC: 35.3 G/DL
MCV RBC AUTO: 85 FL
MONOCYTES # BLD AUTO: 0.5 K/UL
MONOCYTES NFR BLD: 8.1 %
NEUTROPHILS # BLD AUTO: 4.2 K/UL
NEUTROPHILS NFR BLD: 71.1 %
PHOSPHATE SERPL-MCNC: 2.3 MG/DL
PLATELET # BLD AUTO: 138 K/UL
PMV BLD AUTO: 8.9 FL
POTASSIUM SERPL-SCNC: 4 MMOL/L
RBC # BLD AUTO: 4.9 M/UL
SODIUM SERPL-SCNC: 135 MMOL/L
TACROLIMUS BLD-MCNC: 6 NG/ML
WBC # BLD AUTO: 5.91 K/UL

## 2017-09-08 PROCEDURE — 80069 RENAL FUNCTION PANEL: CPT

## 2017-09-08 PROCEDURE — 80197 ASSAY OF TACROLIMUS: CPT

## 2017-09-08 PROCEDURE — 83735 ASSAY OF MAGNESIUM: CPT

## 2017-09-08 PROCEDURE — 85025 COMPLETE CBC W/AUTO DIFF WBC: CPT

## 2017-09-08 PROCEDURE — 36415 COLL VENOUS BLD VENIPUNCTURE: CPT

## 2017-09-08 NOTE — TELEPHONE ENCOUNTER
Contact made to wife, discussed next labs and appt.  Advised to seek medical attention or contact [facility] on call for any s/s of infection, increased b/p, difficulty urinating.

## 2017-09-08 NOTE — PROGRESS NOTES
His Alb is profoundly low , around 1.3. Pending UPCR. If Alb remains low in spite of Ritux therapy, he might need anticoag therapy due to being high risk to develop thrombosis. Will discuss with team after next lab come back.

## 2017-09-19 ENCOUNTER — TELEPHONE (OUTPATIENT)
Dept: TRANSPLANT | Facility: CLINIC | Age: 57
End: 2017-09-19

## 2017-09-19 NOTE — TELEPHONE ENCOUNTER
----- Message from Sherrie Castillo MD sent at 9/18/2017  8:06 AM CDT -----  acceptable lab. Albuminemia improving. Pending UPCR, pending BK

## 2017-09-22 ENCOUNTER — TELEPHONE (OUTPATIENT)
Dept: TRANSPLANT | Facility: CLINIC | Age: 57
End: 2017-09-22

## 2017-09-22 NOTE — TELEPHONE ENCOUNTER
Received call from lab at [facility] with tacro level of 4.7.  Level within target range.

## 2017-10-04 ENCOUNTER — PATIENT MESSAGE (OUTPATIENT)
Dept: TRANSPLANT | Facility: CLINIC | Age: 57
End: 2017-10-04

## 2017-10-15 NOTE — PROGRESS NOTES
Kidney Post-Transplant Assessment    Referring Physician: Faraz Yao  Current Nephrologist: Shai Moore    ORGAN: LEFT KIDNEY  Donor Type:  - brain death   PHS Increased Risk: no  Cold Ischemia: 875 mins  Induction Medications: campath - alemtuzumab (anti-cd52)      Subjective:     CC:  Reassessment of renal allograft function and management of chronic immunosuppression.    Kidney History:Mr. Burgess is a 57 y.o. year old  or Other  male who received a  - brain death kidney transplant on 13 for ? MPGN/? MN (induction: Campath, CMV status: D+, R-,  bridged with heparin postoperatively for his history of antiphospholipid antibody syndrome) . Immunosuppression with Prograf and Cellcept. His baseline creatinine has been 1.0-1.3.       Patient developed proteinuria of >4 gram for which his nephrologist contacted [facility] transplant for possible kidney biopsy in 2017. Patient underwent kidney allograft biopsy while he was bridged with heparin for his history of antiphospholipid syndrome at [facility] in 2017. His biopsy was consistent with membranous nephropathy and focal acute tubular injury              Interval History: Pt has received 2 doses of Rituximab, last dose was 17. Pt states that he though that his urine was becoming less frothy but had not had any improvement in his edema. Per pt edema is most noticeable to thighs and abdomen. Per pt and with lower legs are always swollen. Pt did ask if him holding his bladder throughout the day while working could be worsening his proteinuria.            Medications:  Current Outpatient Prescriptions   Medication Sig Dispense Refill    atorvastatin (LIPITOR) 40 MG tablet Take 1 tablet (40 mg total) by mouth once daily. 1 Tablet Oral Every day 30 tablet 5    benazepril (LOTENSIN) 10 MG tablet Take 10 mg by mouth every evening.      ERGOCALCIFEROL, VITAMIN D2, MISC 1.25 mg by Misc.(Non-Drug;  Combo Route) route every 30 days.      esomeprazole (NEXIUM) 20 MG capsule Take 40 mg by mouth before breakfast.       levothyroxine (SYNTHROID) 50 MCG tablet Take 50 mcg by mouth once daily.      linagliptin (TRADJENTA) 5 mg Tab tablet Take 5 mg by mouth once daily.      magnesium oxide (MAG-OX) 400 mg tablet Take 400 mg by mouth once daily.      metoprolol tartrate (LOPRESSOR) 50 MG tablet Take 50 mg by mouth 2 (two) times daily.      multivitamin (THERAGRAN) tablet Take 1 tablet by mouth once daily. 30 tablet 0    mycophenolate (CELLCEPT) 250 mg Cap TAKE (2) CAPSULES TWICE DAILY. 120 capsule 11    tacrolimus (PROGRAF) 1 MG Cap TAKE 1 CAPSULE BY MOUTH EVERY 12 HORUS 60 capsule 11    warfarin (COUMADIN) 5 MG tablet Take 7.5 mg by mouth on Monday, Wed, Thursday, Saturday and Sunday evenings. Take 10 mg by mouth on Tuesday and Friday evening. 24 tablet 1    furosemide (LASIX) 80 MG tablet 60 mg.       No current facility-administered medications for this visit.            Review of Systems     Constitutional: Negative for fever, appetite change and fatigue.   HENT: Negative for hearing loss, sore throat and mouth sores.   Eyes: Negative for photophobia, pain and visual disturbance.   Respiratory: Negative for cough, chest tightness, shortness of breath and wheezing.   Cardiovascular: Negative for chest pain and palpitations. + Leg swelling.   Gastrointestinal: Negative for nausea, vomiting, abdominal pain, diarrhea, constipation, blood in stool. Abdominal edema.   Genitourinary: Negative for dysuria, urgency, frequency, hematuria, decreased urine volume, difficulty urinating.   Musculoskeletal: Negative for back pain, joint swelling, arthralgias and gait problem.   Skin: Negative for pallor, rash and wound.   Neurological: Negative for dizziness, tremors, syncope, weakness, light-headedness and headaches.   Immunologic: immune suppressed  Hematological: Negative for adenopathy. Does bruise/bleed easily as  he is on coumadin.   Psychiatric/Behavioral: Negative for confusion, sleep disturbance and dysphoric mood. The patient is not nervous/anxious.                    Objective:   Blood pressure 132/83, pulse 60, temperature 97.8 °F (36.6 °C), temperature source Oral, resp. rate 16, height 6' (1.829 m), weight 104.9 kg (231 lb 4.2 oz), SpO2 99 %.body mass index is 31.36 kg/m².    Physical Exam     General: No acute distress, well groomed  HEENT: Normocephalic, atraumatic,  moist mucous membranes, no oral ulcerations/lesions  Neck: Supple, symmetrical, trachea midline, no thyromegaly  Respiratory: Clear to auscultation bilaterally, respirations unlabored, no rales/rhonchi/wheezing  Cardiovacular: Regular rate and rhythm, S1, S2 normal, no murmurs,  No JVD, no carotid bruit  Gastrointestinal: Soft, non-tender, bowel sounds normal, no hepatosplenomegaly  Renal allograft exam: No tenderness, no bruits, normal exam  Musculoskeletal: No knee or ankle joint tenderness or swelling.   Extremities: No clubbing or cyanosis of bilateral upper extremities; 2+ lower extremity edema bilaterally  Skin: warm and dry; no rash on exposed skin  Neurologic: CN grossly intact,  alert and oriented x 3      Labs:  Lab Results   Component Value Date    WBC 5.91 09/08/2017    HGB 14.7 09/08/2017    HCT 41.6 09/08/2017     (L) 09/08/2017    K 4.0 09/08/2017     09/08/2017    CO2 23 09/08/2017    BUN 19 09/08/2017    CREATININE 1.0 09/08/2017    EGFRNONAA >60.0 09/08/2017    CALCIUM 8.1 (L) 09/08/2017    PHOS 2.3 (L) 09/08/2017    MG 1.6 09/08/2017    ALBUMIN 1.3 (L) 09/08/2017    AST 43 (H) 07/21/2013    ALT 69 (H) 07/21/2013       Lab Results   Component Value Date    EXTANC  07/08/2015      Comment:      clinic 7/15/15    EXTWBC 4.8 (L) 10/12/2017    EXTSEGS 70 10/12/2017    EXTPLATELETS 141 10/12/2017    EXTHEMOGLOBI 14.7 10/12/2017    EXTHEMATOCRI 43.6 10/12/2017    EXTCREATININ 1.0 10/12/2017    EXTSODIUM 135 10/12/2017     EXTPOTASSIUM 4.6 10/12/2017    EXTBUN 22 10/12/2017    EXTCO2 26 10/12/2017    EXTCALCIUM 8.3 (L) 10/12/2017    EXTPHOSPHORU 3.1 10/12/2017    EXTGLUCOSE 152 2017    EXTALBUMIN 2.0 10/12/2017    EXTAST 22 2017    EXTALT 17 2017    EXTBILITOTAL 0.2 2017       Lab Results   Component Value Date    EXTSIROLIMUS 5.5 2016    EXTTACROLVL 4.0 10/12/2017    EXTPROTCRE 7.87 10/12/2017    EXTPTHINTACT 140 (H) 2017    EXTPROTEINUA 3+ 2017    EXTWBCUA neg 2014    EXTRBCUA 0-3 2017       Labs were reviewed with the patient.    Assessment/Plan:     1. Chronic immunosuppression with Prograf and Cellcept    2. Proteinuria, unspecified type    3. Membranous nephropathy determined by biopsy    4. -donor kidney transplant for GN 13    5. Antiphospholipid antibody syndrome on indefinite anticoagulation    6. Hypothyroidism, unspecified type    7. Essential hypertension        Mr. Burgess is a 57 y.o. male with:     # History of ESRD presumed secondary to ? MPGN/ ? MN s/p DDKT in   - baseline Cr 1.0 to 1.3  - last Cr 1.0   - UPC ratio reportedly : 7.87 gram  - Pending UPC ratio from today and will replete in 2 weeks  - Continue ACEI     # Proteinuria  - Kidney biopsy : MN and focal acute tubular injury. Requested native kidney biopsy from nephrology office, they don't have it. It is not clear whether this MN is de sara or recurrent.    - Serum PLA2R positive  - Pt has had 2 doses of rituximab. He had out pt labs that showed improvement in protein/Cr ratio from 17 to less than 1 but repeat on 10/12/17 had worsened again to 7.8  - Repeating urine protein creatinine ratio. Unsure whether or not to try rituximab again or to escalate treatment. Will discuss case with nephrology group.    # Immunosuppression:   - continue Prograf, prograf level from 17 was 6  - continue  mg BID   - will monitor closely for toxicities       # HTN:   - BPs well controlled   -  will hold nifedipine due to his nephrotic range proteinuria  - continue with home blood pressure monitoring and call us in 4 days  - low salt and healthy life discussed with the patient     # Metabolic Bone Disease/Secondary Hyperparathyroidism:  - Ca 8.3, corrected Ca 9.9, phos 3.1        Follow-up:   In 3 months      Robert Enamorado MD      I have reviewed the notes, assessments, and/or procedures performed by Dr. Enamorado, I concur with her/his documentation of Anthony Burgess.      Plan:    - UPCR today  - UPCR in 2 weeks  - Hold nifedipine for now due to nephrotic range proteinuria  - He will call us for home BP readings in 4 days  - Will discuss the case with nephrology group  - RTC in 3 months     Sherrie Castillo MD

## 2017-10-16 ENCOUNTER — DOCUMENTATION ONLY (OUTPATIENT)
Dept: TRANSPLANT | Facility: CLINIC | Age: 57
End: 2017-10-16

## 2017-10-16 ENCOUNTER — OFFICE VISIT (OUTPATIENT)
Dept: TRANSPLANT | Facility: CLINIC | Age: 57
End: 2017-10-16
Payer: MEDICARE

## 2017-10-16 VITALS
BODY MASS INDEX: 31.32 KG/M2 | RESPIRATION RATE: 16 BRPM | TEMPERATURE: 98 F | SYSTOLIC BLOOD PRESSURE: 132 MMHG | HEART RATE: 60 BPM | DIASTOLIC BLOOD PRESSURE: 83 MMHG | WEIGHT: 231.25 LBS | OXYGEN SATURATION: 99 % | HEIGHT: 72 IN

## 2017-10-16 DIAGNOSIS — Z29.89 PROPHYLACTIC IMMUNOTHERAPY: Primary | Chronic | ICD-10-CM

## 2017-10-16 DIAGNOSIS — R80.9 PROTEINURIA, UNSPECIFIED TYPE: ICD-10-CM

## 2017-10-16 DIAGNOSIS — I10 ESSENTIAL HYPERTENSION: Chronic | ICD-10-CM

## 2017-10-16 DIAGNOSIS — N02.2 MEMBRANOUS NEPHROPATHY DETERMINED BY BIOPSY: ICD-10-CM

## 2017-10-16 DIAGNOSIS — D68.61 ANTIPHOSPHOLIPID ANTIBODY SYNDROME: ICD-10-CM

## 2017-10-16 DIAGNOSIS — Z94.0 DECEASED-DONOR KIDNEY TRANSPLANT: Chronic | ICD-10-CM

## 2017-10-16 DIAGNOSIS — E03.9 HYPOTHYROIDISM, UNSPECIFIED TYPE: ICD-10-CM

## 2017-10-16 LAB
EXT ALBUMIN: 2
EXT BUN: 22
EXT CALCIUM: 8.3
EXT CHLORIDE: 100
EXT CO2: 26
EXT CREATININE: 1 MG/DL
EXT EOSINOPHIL%: 2
EXT GFR MDRD NON AF AMER: 77
EXT HEMATOCRIT: 43.6
EXT HEMOGLOBIN: 14.7
EXT LYMPH%: 18
EXT MAGNESIUM: 1.6
EXT MONOCYTES%: 9
EXT PHOSPHORUS: 3.1
EXT PLATELETS: 141
EXT POTASSIUM: 4.6
EXT PROT/CREAT RATIO UR: 0.83
EXT PROT/CREAT RATIO UR: 7.87
EXT SEGS%: 70
EXT SODIUM: 135 MMOL/L
EXT TACROLIMUS LVL: 4
EXT WBC: 4.8

## 2017-10-16 PROCEDURE — 99213 OFFICE O/P EST LOW 20 MIN: CPT | Mod: PBBFAC | Performed by: INTERNAL MEDICINE

## 2017-10-16 PROCEDURE — 99215 OFFICE O/P EST HI 40 MIN: CPT | Mod: S$PBB,,, | Performed by: INTERNAL MEDICINE

## 2017-10-16 PROCEDURE — 99999 PR PBB SHADOW E&M-EST. PATIENT-LVL III: CPT | Mod: PBBFAC,,, | Performed by: INTERNAL MEDICINE

## 2017-10-16 PROCEDURE — 82570 ASSAY OF URINE CREATININE: CPT

## 2017-10-16 RX ORDER — METOPROLOL TARTRATE 50 MG/1
100 TABLET ORAL 2 TIMES DAILY
COMMUNITY

## 2017-10-16 RX ORDER — NIFEDIPINE 60 MG/1
60 TABLET, EXTENDED RELEASE ORAL DAILY
COMMUNITY
End: 2017-10-16 | Stop reason: SINTOL

## 2017-10-16 RX ORDER — FUROSEMIDE 80 MG/1
60 TABLET ORAL DAILY
COMMUNITY
Start: 2017-10-14 | End: 2018-08-03 | Stop reason: ALTCHOICE

## 2017-10-16 NOTE — LETTER
October 16, 2017        Shai Moore  [address] [place], [place] [address]VD  [place] [address]  Phone: [phone]  Fax: [phone]             [facility]  [address]  [facility] [address]  Phone: [phone]   Patient: Anthony Burgess   MR Number: [id number]   YOB: 1960   Date of Visit: 10/16/2017       Dear Dr. Shai Moore    Thank you for referring Anthony Burgess to me for evaluation. Attached you will find relevant portions of my assessment and plan of care.    If you have questions, please do not hesitate to call me. I look forward to following Anthony Burgess along with you.    Sincerely,    Sherrie Castillo MD    Enclosure    If you would like to receive this communication electronically, please contact [email or web address] or [phone] to request Action Link access.    Action Link is a tool which provides read-only access to select patient information with whom you have a relationship. Its easy to use and provides real time access to review your patients record including encounter summaries, notes, results, and demographic information.    If you feel you have received this communication in error or would no longer like to receive these types of communications, please e-mail [email or web address]

## 2017-10-17 LAB
CREAT UR-MCNC: 139 MG/DL
PROT UR-MCNC: 1239 MG/DL
PROT/CREAT RATIO, UR: 8.91

## 2017-10-17 NOTE — PROGRESS NOTES
Still had significant proteinuria after ritux therapy. Will discuss the case with nephrology group for further action.

## 2017-10-20 ENCOUNTER — TELEPHONE (OUTPATIENT)
Dept: TRANSPLANT | Facility: CLINIC | Age: 57
End: 2017-10-20

## 2017-10-20 RX ORDER — BENAZEPRIL HYDROCHLORIDE 10 MG/1
20 TABLET ORAL NIGHTLY
Qty: 60 TABLET | Refills: 5 | Status: SHIPPED | OUTPATIENT
Start: 2017-10-20 | End: 2017-11-09 | Stop reason: SINTOL

## 2017-10-20 NOTE — TELEPHONE ENCOUNTER
Received call from patient to report blood pressures:    Tuesday: /78 pulse 69  /79 73    Wed AM: 158/77  70    Thur AM: 133/79  81  PM: 134/78  68    Friday: /78  69    Sent to MD for review

## 2017-10-20 NOTE — TELEPHONE ENCOUNTER
Pt contacted, spoke to wife and advised of MD's B/P readings and instructions.  She repeated instructions and verbalized understanding.       Per Dr. Castillo,   Please increase benazepril to 20 mg daily and ask patient to continue to check his home PB.

## 2017-10-24 ENCOUNTER — TELEPHONE (OUTPATIENT)
Dept: TRANSPLANT | Facility: CLINIC | Age: 57
End: 2017-10-24

## 2017-10-24 NOTE — TELEPHONE ENCOUNTER
Patient's wife contacted, advised of MD instructions for urine test and labs.  Patient will have these labs this week.  Once resulted, MD to review and decide on treatment. Wife verbalized understanding

## 2017-11-08 ENCOUNTER — TELEPHONE (OUTPATIENT)
Dept: TRANSPLANT | Facility: CLINIC | Age: 57
End: 2017-11-08

## 2017-11-08 NOTE — TELEPHONE ENCOUNTER
Patient contacted, he reports that his benazepril was increased at the last appt... He says that about 4 days later, he start having fever, chills and a dry cough.  He saw his primary doctor, who started him on antibiotics.      He says he improved, but has been having fever, chills and a cough again since 11/4.  This writer strongly encouraged that he go to ER for any temp of 100.4 or greater and that he contact PCP as well.     Patient reports b/p have been 150's to 160's over 70-s since changing the benazepril to 10 mg twice daily.      Message forwarded to MD for review and instructions.

## 2017-11-08 NOTE — TELEPHONE ENCOUNTER
----- Message from Mery Cast sent at 11/8/2017 12:04 PM CST -----  Contact: patient   Patient would like a call about his medication increases.       Please sabrina

## 2017-11-09 NOTE — TELEPHONE ENCOUNTER
Patient/wife contacted, advised of instructions per MD.  She verbalized instructions.  This writer will forward MD note to [place] [facility] and contacted charge nurse to advise of patient's pending arrival.          Please ask patient to go to ER to have flu test, CXR, UA, urine culture, blood culture. In addition order CMV PCR with next lab. He needs to hold benzopril which might cause of dry cough and increase metoprolol to 100 mg BID. He needs to follow with his nephrologist in 1-2 weeks.       Thank you   Sherrie

## 2017-11-21 ENCOUNTER — DOCUMENTATION ONLY (OUTPATIENT)
Dept: TRANSPLANT | Facility: CLINIC | Age: 57
End: 2017-11-21

## 2017-11-21 LAB
25(OH)D3 SERPL-MCNC: 5 NG/ML (ref 25–80)
CREAT CL/1.73 SQ M 24H UR+SERPL-ARVRAT: 0.1 ML/MIN/{1.73_M2}
EXT ALBUMIN: 2
EXT ALBUMIN: 2
EXT ALKALINE PHOSPHATASE: 236
EXT ALT: 16
EXT AST: 18
EXT BILIRUBIN TOTAL: 0.1
EXT BUN: 25
EXT CALCIUM: 8.8
EXT CHLORIDE: 101
EXT CO2: 24
EXT CREATININE: 1.1 MG/DL
EXT EOSINOPHIL%: 3
EXT GFR MDRD NON AF AMER: 69
EXT GLUCOSE: 142
EXT HEMATOCRIT: 38.4
EXT HEMOGLOBIN: 12.8
EXT LYMPH%: 16
EXT MONOCYTES%: 8
EXT PLATELETS: 268
EXT POTASSIUM: 5.2
EXT PROT/CREAT RATIO UR: 5.22
EXT PROTEIN TOTAL: 4.9
EXT PTH, INTACT: 3.6
EXT SEGS%: 73
EXT SODIUM: 135 MMOL/L
EXT TACROLIMUS LVL: 5.9
EXT WBC: 5
PROT 24H UR-MRATE: 132 G/(24.H) (ref 0–150)

## 2017-11-21 NOTE — PROGRESS NOTES
Significant viatmin D deficiency: please Ergo 61529 mg every week, he is on monthly dose. Please check PTH with next lab  High K: Low K diet  High PLA2R:  Will discuss with group regarding more treatment

## 2017-11-22 RX ORDER — ERGOCALCIFEROL 1.25 MG/1
50000 CAPSULE ORAL
Qty: 4 CAPSULE | Refills: 6 | Status: SHIPPED | OUTPATIENT
Start: 2017-11-22 | End: 2018-07-08 | Stop reason: SDUPTHER

## 2017-11-22 NOTE — TELEPHONE ENCOUNTER
Pt contacted and advised of MD's lab review and instructions.  Pt. repeated instructions and verbalized understanding.     Will get labs on 11/28.

## 2017-11-22 NOTE — TELEPHONE ENCOUNTER
----- Message from Lito Perry RN sent at 11/21/2017  2:10 PM CST -----      ----- Message -----  From: Sherrie Castillo MD  Sent: 11/21/2017   1:23 PM  To: [facility] Post-Kidney Transplant Clinical    Significant viatmin D deficiency: please Ergo 22044 mg every week, he is on monthly dose. Please check PTH with next lab  High K: Low K diet  High PLA2R:  Will discuss with group regarding more treatment

## 2017-12-05 DIAGNOSIS — Z94.0 DECEASED-DONOR KIDNEY TRANSPLANT: Primary | Chronic | ICD-10-CM

## 2017-12-05 RX ORDER — ACETAMINOPHEN 325 MG/1
650 TABLET ORAL
Status: CANCELLED | OUTPATIENT
Start: 2017-12-06

## 2017-12-05 RX ORDER — FAMOTIDINE 10 MG/ML
20 INJECTION INTRAVENOUS
Status: CANCELLED | OUTPATIENT
Start: 2017-12-06

## 2017-12-05 RX ORDER — MEPERIDINE HYDROCHLORIDE 50 MG/ML
25 INJECTION INTRAMUSCULAR; INTRAVENOUS; SUBCUTANEOUS
Status: CANCELLED | OUTPATIENT
Start: 2017-12-05

## 2017-12-06 ENCOUNTER — TELEPHONE (OUTPATIENT)
Dept: TRANSPLANT | Facility: CLINIC | Age: 57
End: 2017-12-06

## 2017-12-06 ENCOUNTER — PATIENT MESSAGE (OUTPATIENT)
Dept: TRANSPLANT | Facility: CLINIC | Age: 57
End: 2017-12-06

## 2017-12-06 NOTE — TELEPHONE ENCOUNTER
----- Message from Heather Cam RN sent at 12/5/2017  8:39 PM CST -----  Regarding: RE: Rituxan Therapy Plan  Approved on referral [id number]    ----- Message -----  From: Lito Perry RN  Sent: 12/5/2017   4:47 PM  To: Heather Cam RN  Subject: Rituxan Therapy Plan                             Please provide authorization for Rituxan at the [facility] starting week of 12/25-- every 2 weeks for 4 doses per MD orders.      Therapy plan was entered.     Thank you,   Lito

## 2017-12-07 ENCOUNTER — DOCUMENTATION ONLY (OUTPATIENT)
Dept: TRANSPLANT | Facility: CLINIC | Age: 57
End: 2017-12-07

## 2017-12-07 ENCOUNTER — TELEPHONE (OUTPATIENT)
Dept: TRANSPLANT | Facility: CLINIC | Age: 57
End: 2017-12-07

## 2017-12-07 DIAGNOSIS — N25.81 HYPERPARATHYROIDISM, SECONDARY RENAL: ICD-10-CM

## 2017-12-07 DIAGNOSIS — E55.9 VITAMIN D DEFICIENCY: Primary | ICD-10-CM

## 2017-12-07 DIAGNOSIS — Z94.0 KIDNEY REPLACED BY TRANSPLANT: Primary | ICD-10-CM

## 2017-12-07 DIAGNOSIS — Z79.60 LONG-TERM USE OF IMMUNOSUPPRESSANT MEDICATION: ICD-10-CM

## 2017-12-07 LAB
EXT CMV DNA QUANT. BY PCR: NORMAL
EXT IMMUNKNOW (STIMULATED): 373
EXT PTH, INTACT: 75.5

## 2017-12-07 NOTE — TELEPHONE ENCOUNTER
SW received a [facility] request for pt to stay locally due to needing infusion services. The message below is what the pt sent the coordinator ,    For these next four visits that we have to make for the treatments, I just want to verify that you are getting us approved for the hotel stays because we certainly cannot afford all those hotel nights.  It's going to be a hardship for us just for food and gas for these trips plus loss of income from not being available to work during those time   Coordinator reports patient will need 2 night stays on (8 nights in total):     12/27,12/28   1/10,1/11 1/24,1/25   And   2/7,2/8    KASSI sent request to Jake and [facility] and awaits confirmation to proceed. KASSI remains available and will follow up.

## 2017-12-07 NOTE — TELEPHONE ENCOUNTER
SW submitted [facility] request (due to receiving Rituxan infusion locally) and received confirmation via e-mail. Pt's lodging dates are below:     12/27,12/28   1/10,1/11 1/24,1/25   And   2/7,2/8    SW contacted pt to inform of reservation. KASSI also reports pt will need to complete and submit financial profile prior to next check-in. Pt verbalized understanding and agreement. SW to follow up with coordinator. SW remains available.

## 2017-12-08 ENCOUNTER — PATIENT MESSAGE (OUTPATIENT)
Dept: TRANSPLANT | Facility: CLINIC | Age: 57
End: 2017-12-08

## 2017-12-19 ENCOUNTER — DOCUMENTATION ONLY (OUTPATIENT)
Dept: TRANSPLANT | Facility: CLINIC | Age: 57
End: 2017-12-19

## 2017-12-19 LAB
EXT ALBUMIN: 2.2
EXT BUN: 25
EXT CALCIUM: 8.8
EXT CHLORIDE: 97
EXT CO2: 28
EXT CREATININE: 1.2 MG/DL
EXT EOSINOPHIL%: 3
EXT GFR MDRD NON AF AMER: 62
EXT GLUCOSE: 179
EXT HEMATOCRIT: 30.6
EXT HEMOGLOBIN: 10.2
EXT LYMPH%: 16
EXT MAGNESIUM: 1.7
EXT MONOCYTES%: 7
EXT PHOSPHORUS: 3.4
EXT PLATELETS: 188
EXT POTASSIUM: 5.1
EXT PROT/CREAT RATIO UR: 2.47
EXT PTH, INTACT: 70.6 (ref 15–65)
EXT SEGS%: 74
EXT SODIUM: 131 MMOL/L
EXT TACROLIMUS LVL: 3.1
EXT VIT D 25 HYDROXY: 5.2
EXT WBC: 5.4

## 2017-12-20 ENCOUNTER — TELEPHONE (OUTPATIENT)
Dept: TRANSPLANT | Facility: CLINIC | Age: 57
End: 2017-12-20

## 2017-12-20 RX ORDER — TACROLIMUS 1 MG/1
CAPSULE ORAL
Qty: 90 CAPSULE | Refills: 11 | Status: SHIPPED | OUTPATIENT
Start: 2017-12-20 | End: 2017-12-29 | Stop reason: DRUGHIGH

## 2017-12-20 NOTE — TELEPHONE ENCOUNTER
----- Message from Lito Perry RN sent at 12/20/2017 12:02 PM CST -----      ----- Message -----  From: Sherrie Castillo MD  Sent: 12/20/2017  11:52 AM  To: [facility] Post-Kidney Transplant Clinical    Low Na: Please advise for less water intake   High K: please advise for Low K diet.   Proteinuria: improving  Tac of 3.1: please increase tac to 2/1 mg if it is a true trough and check renal panel and tac in 1-2 weeks.      Thank you!

## 2017-12-20 NOTE — PROGRESS NOTES
Low Na: Please advise for less water intake   High K: please advise for Low K diet.   Proteinuria: improving  Tac of 3.1: please increase tac to 2/1 mg if it is a true trough and check renal panel and tac in 1-2 weeks.      Thank you!

## 2017-12-20 NOTE — TELEPHONE ENCOUNTER
----- Message from Lito Perry RN sent at 12/20/2017 12:02 PM CST -----      ----- Message -----  From: Sherrie Castillo MD  Sent: 12/20/2017  11:52 AM  To: [facility] Post-Kidney Transplant Clinical    Low Na: Please advise for less water intake   High K: please advise for Low K diet.   Proteinuria: improving  Tac of 3.1: please increase tac to 2/1 mg if it is a true trough and check renal panel and tac in 1-2 weeks.      Thank you!

## 2017-12-28 ENCOUNTER — INFUSION (OUTPATIENT)
Dept: INFUSION THERAPY | Facility: HOSPITAL | Age: 57
End: 2017-12-28
Attending: INTERNAL MEDICINE
Payer: MEDICARE

## 2017-12-28 VITALS
SYSTOLIC BLOOD PRESSURE: 146 MMHG | HEART RATE: 56 BPM | HEIGHT: 72 IN | DIASTOLIC BLOOD PRESSURE: 70 MMHG | BODY MASS INDEX: 30.19 KG/M2 | WEIGHT: 222.88 LBS | RESPIRATION RATE: 18 BRPM

## 2017-12-28 DIAGNOSIS — T86.11 KIDNEY TRANSPLANT REJECTION: ICD-10-CM

## 2017-12-28 DIAGNOSIS — N02.2 MEMBRANOUS NEPHROPATHY DETERMINED BY BIOPSY: Primary | ICD-10-CM

## 2017-12-28 DIAGNOSIS — Z94.0 DECEASED-DONOR KIDNEY TRANSPLANT: ICD-10-CM

## 2017-12-28 DIAGNOSIS — D68.61 ANTIPHOSPHOLIPID ANTIBODY SYNDROME: ICD-10-CM

## 2017-12-28 PROCEDURE — 25000003 PHARM REV CODE 250: Performed by: INTERNAL MEDICINE

## 2017-12-28 PROCEDURE — 96415 CHEMO IV INFUSION ADDL HR: CPT

## 2017-12-28 PROCEDURE — 96413 CHEMO IV INFUSION 1 HR: CPT

## 2017-12-28 PROCEDURE — S0028 INJECTION, FAMOTIDINE, 20 MG: HCPCS | Performed by: INTERNAL MEDICINE

## 2017-12-28 PROCEDURE — 96375 TX/PRO/DX INJ NEW DRUG ADDON: CPT

## 2017-12-28 PROCEDURE — 96367 TX/PROPH/DG ADDL SEQ IV INF: CPT

## 2017-12-28 PROCEDURE — 63600175 PHARM REV CODE 636 W HCPCS: Performed by: INTERNAL MEDICINE

## 2017-12-28 RX ORDER — FAMOTIDINE 10 MG/ML
20 INJECTION INTRAVENOUS
Status: DISCONTINUED | OUTPATIENT
Start: 2017-12-29 | End: 2017-12-28 | Stop reason: HOSPADM

## 2017-12-28 RX ORDER — ACETAMINOPHEN 325 MG/1
650 TABLET ORAL
Status: DISCONTINUED | OUTPATIENT
Start: 2017-12-29 | End: 2017-12-28 | Stop reason: HOSPADM

## 2017-12-28 RX ORDER — FAMOTIDINE 10 MG/ML
20 INJECTION INTRAVENOUS
Status: CANCELLED | OUTPATIENT
Start: 2017-12-29

## 2017-12-28 RX ORDER — MEPERIDINE HYDROCHLORIDE 50 MG/ML
25 INJECTION INTRAMUSCULAR; INTRAVENOUS; SUBCUTANEOUS
Status: DISCONTINUED | OUTPATIENT
Start: 2017-12-28 | End: 2017-12-28 | Stop reason: HOSPADM

## 2017-12-28 RX ORDER — MEPERIDINE HYDROCHLORIDE 50 MG/ML
25 INJECTION INTRAMUSCULAR; INTRAVENOUS; SUBCUTANEOUS
Status: CANCELLED | OUTPATIENT
Start: 2017-12-28

## 2017-12-28 RX ORDER — ACETAMINOPHEN 325 MG/1
650 TABLET ORAL
Status: CANCELLED | OUTPATIENT
Start: 2017-12-29

## 2017-12-28 RX ADMIN — FAMOTIDINE 20 MG: 10 INJECTION INTRAVENOUS at 08:12

## 2017-12-28 RX ADMIN — DIPHENHYDRAMINE HYDROCHLORIDE 25 MG: 50 INJECTION INTRAMUSCULAR; INTRAVENOUS at 08:12

## 2017-12-28 RX ADMIN — ACETAMINOPHEN 650 MG: 325 TABLET ORAL at 08:12

## 2017-12-28 RX ADMIN — RITUXIMAB 851 MG: 10 INJECTION, SOLUTION INTRAVENOUS at 09:12

## 2017-12-29 ENCOUNTER — PATIENT MESSAGE (OUTPATIENT)
Dept: TRANSPLANT | Facility: CLINIC | Age: 57
End: 2017-12-29

## 2017-12-29 ENCOUNTER — TELEPHONE (OUTPATIENT)
Dept: TRANSPLANT | Facility: CLINIC | Age: 57
End: 2017-12-29

## 2017-12-29 NOTE — TELEPHONE ENCOUNTER
----- Message from Lito Perry RN sent at 12/29/2017 11:06 AM CST -----      ----- Message -----  From: Sherrie Castillo MD  Sent: 12/29/2017  11:04 AM  To: [facility] Post-Kidney Transplant Clinical    High tac level: if it is a true trough, please decrease prograf to 1 mg BID and check level in 2 weeks. Thank you!

## 2017-12-29 NOTE — TELEPHONE ENCOUNTER
Attempted contact to patient, no answer, left message with call back information. Sending [facility] message.

## 2017-12-29 NOTE — TELEPHONE ENCOUNTER
----- Message from Sherrie Castillo MD sent at 12/29/2017 11:04 AM CST -----  High tac level: if it is a true trough, please decrease prograf to 1 mg BID and check level in 2 weeks. Thank you!

## 2017-12-30 RX ORDER — TACROLIMUS 1 MG/1
CAPSULE ORAL
Qty: 60 CAPSULE | Refills: 11 | Status: SHIPPED | OUTPATIENT
Start: 2017-12-30 | End: 2018-12-17 | Stop reason: SDUPTHER

## 2018-01-11 ENCOUNTER — INFUSION (OUTPATIENT)
Dept: INFUSION THERAPY | Facility: HOSPITAL | Age: 58
End: 2018-01-11
Attending: INTERNAL MEDICINE
Payer: MEDICARE

## 2018-01-11 VITALS
WEIGHT: 220 LBS | HEART RATE: 64 BPM | SYSTOLIC BLOOD PRESSURE: 160 MMHG | BODY MASS INDEX: 29.8 KG/M2 | HEIGHT: 72 IN | RESPIRATION RATE: 18 BRPM | DIASTOLIC BLOOD PRESSURE: 74 MMHG | TEMPERATURE: 98 F

## 2018-01-11 DIAGNOSIS — Z94.0 DECEASED-DONOR KIDNEY TRANSPLANT: ICD-10-CM

## 2018-01-11 DIAGNOSIS — D68.61 ANTIPHOSPHOLIPID ANTIBODY SYNDROME: ICD-10-CM

## 2018-01-11 DIAGNOSIS — N02.2 MEMBRANOUS NEPHROPATHY DETERMINED BY BIOPSY: Primary | ICD-10-CM

## 2018-01-11 DIAGNOSIS — T86.11 KIDNEY TRANSPLANT REJECTION: ICD-10-CM

## 2018-01-11 PROCEDURE — 63600175 PHARM REV CODE 636 W HCPCS: Performed by: INTERNAL MEDICINE

## 2018-01-11 PROCEDURE — S0028 INJECTION, FAMOTIDINE, 20 MG: HCPCS | Performed by: INTERNAL MEDICINE

## 2018-01-11 PROCEDURE — 96375 TX/PRO/DX INJ NEW DRUG ADDON: CPT

## 2018-01-11 PROCEDURE — 96415 CHEMO IV INFUSION ADDL HR: CPT

## 2018-01-11 PROCEDURE — 25000003 PHARM REV CODE 250: Performed by: INTERNAL MEDICINE

## 2018-01-11 PROCEDURE — 96413 CHEMO IV INFUSION 1 HR: CPT

## 2018-01-11 PROCEDURE — 96367 TX/PROPH/DG ADDL SEQ IV INF: CPT

## 2018-01-11 RX ORDER — ACETAMINOPHEN 325 MG/1
650 TABLET ORAL
Status: DISCONTINUED | OUTPATIENT
Start: 2018-01-12 | End: 2018-01-11

## 2018-01-11 RX ORDER — FAMOTIDINE 10 MG/ML
20 INJECTION INTRAVENOUS
Status: DISCONTINUED | OUTPATIENT
Start: 2018-01-12 | End: 2018-01-11

## 2018-01-11 RX ORDER — FAMOTIDINE 10 MG/ML
20 INJECTION INTRAVENOUS
Status: DISCONTINUED | OUTPATIENT
Start: 2018-01-11 | End: 2018-01-11 | Stop reason: HOSPADM

## 2018-01-11 RX ORDER — ACETAMINOPHEN 325 MG/1
650 TABLET ORAL
Status: DISCONTINUED | OUTPATIENT
Start: 2018-01-11 | End: 2018-01-11 | Stop reason: HOSPADM

## 2018-01-11 RX ORDER — ACETAMINOPHEN 325 MG/1
650 TABLET ORAL
Status: CANCELLED | OUTPATIENT
Start: 2018-01-12

## 2018-01-11 RX ORDER — MEPERIDINE HYDROCHLORIDE 50 MG/ML
25 INJECTION INTRAMUSCULAR; INTRAVENOUS; SUBCUTANEOUS
Status: CANCELLED | OUTPATIENT
Start: 2018-01-11

## 2018-01-11 RX ORDER — MEPERIDINE HYDROCHLORIDE 50 MG/ML
25 INJECTION INTRAMUSCULAR; INTRAVENOUS; SUBCUTANEOUS
Status: DISCONTINUED | OUTPATIENT
Start: 2018-01-11 | End: 2018-01-11 | Stop reason: HOSPADM

## 2018-01-11 RX ORDER — FAMOTIDINE 10 MG/ML
20 INJECTION INTRAVENOUS
Status: CANCELLED | OUTPATIENT
Start: 2018-01-12

## 2018-01-11 RX ADMIN — FAMOTIDINE 20 MG: 10 INJECTION INTRAVENOUS at 08:01

## 2018-01-11 RX ADMIN — RITUXIMAB 851 MG: 10 INJECTION, SOLUTION INTRAVENOUS at 09:01

## 2018-01-11 RX ADMIN — ACETAMINOPHEN 650 MG: 325 TABLET ORAL at 08:01

## 2018-01-11 RX ADMIN — DIPHENHYDRAMINE HYDROCHLORIDE 25 MG: 50 INJECTION INTRAMUSCULAR; INTRAVENOUS at 08:01

## 2018-01-11 NOTE — PLAN OF CARE
Problem: Patient Care Overview (Adult)  Goal: Plan of Care Review  Outcome: Ongoing (interventions implemented as appropriate)  Patient tolerated ritux well today with nad noted upon discharge. Plan for labs tomorrow am. AVS given. PIV removed, catheter tip intact. Verbalized understanding to call MD for any questions/concerns. Discharged home, ambulated independently with wife by side.

## 2018-01-22 ENCOUNTER — PATIENT MESSAGE (OUTPATIENT)
Dept: TRANSPLANT | Facility: CLINIC | Age: 58
End: 2018-01-22

## 2018-01-24 ENCOUNTER — OFFICE VISIT (OUTPATIENT)
Dept: TRANSPLANT | Facility: CLINIC | Age: 58
End: 2018-01-24
Payer: MEDICARE

## 2018-01-24 ENCOUNTER — LAB VISIT (OUTPATIENT)
Dept: LAB | Facility: HOSPITAL | Age: 58
End: 2018-01-24
Attending: INTERNAL MEDICINE
Payer: MEDICARE

## 2018-01-24 VITALS
HEIGHT: 72 IN | SYSTOLIC BLOOD PRESSURE: 169 MMHG | RESPIRATION RATE: 18 BRPM | OXYGEN SATURATION: 100 % | WEIGHT: 228.81 LBS | BODY MASS INDEX: 30.99 KG/M2 | TEMPERATURE: 98 F | HEART RATE: 60 BPM | DIASTOLIC BLOOD PRESSURE: 91 MMHG

## 2018-01-24 DIAGNOSIS — N02.2 MEMBRANOUS NEPHROPATHY DETERMINED BY BIOPSY: ICD-10-CM

## 2018-01-24 DIAGNOSIS — Z79.60 LONG-TERM USE OF IMMUNOSUPPRESSANT MEDICATION: ICD-10-CM

## 2018-01-24 DIAGNOSIS — N25.81 SECONDARY HYPERPARATHYROIDISM OF RENAL ORIGIN: ICD-10-CM

## 2018-01-24 DIAGNOSIS — Z94.0 DECEASED-DONOR KIDNEY TRANSPLANT: Primary | Chronic | ICD-10-CM

## 2018-01-24 DIAGNOSIS — I73.9 PVD (PERIPHERAL VASCULAR DISEASE): ICD-10-CM

## 2018-01-24 DIAGNOSIS — I12.9 HYPERTENSION, RENAL: ICD-10-CM

## 2018-01-24 DIAGNOSIS — Z29.89 PROPHYLACTIC IMMUNOTHERAPY: Chronic | ICD-10-CM

## 2018-01-24 DIAGNOSIS — R80.9 PROTEINURIA, UNSPECIFIED TYPE: ICD-10-CM

## 2018-01-24 DIAGNOSIS — R73.01 IMPAIRED FASTING GLUCOSE: ICD-10-CM

## 2018-01-24 DIAGNOSIS — D68.61 ANTIPHOSPHOLIPID ANTIBODY SYNDROME: ICD-10-CM

## 2018-01-24 DIAGNOSIS — Z94.0 KIDNEY REPLACED BY TRANSPLANT: ICD-10-CM

## 2018-01-24 LAB
ALBUMIN SERPL BCP-MCNC: 1.6 G/DL
ANION GAP SERPL CALC-SCNC: 6 MMOL/L
BASOPHILS # BLD AUTO: 0.01 K/UL
BASOPHILS NFR BLD: 0.2 %
BUN SERPL-MCNC: 30 MG/DL
CALCIUM SERPL-MCNC: 8.9 MG/DL
CHLORIDE SERPL-SCNC: 102 MMOL/L
CO2 SERPL-SCNC: 26 MMOL/L
CREAT SERPL-MCNC: 1.2 MG/DL
DIFFERENTIAL METHOD: ABNORMAL
EOSINOPHIL # BLD AUTO: 0.1 K/UL
EOSINOPHIL NFR BLD: 1.7 %
ERYTHROCYTE [DISTWIDTH] IN BLOOD BY AUTOMATED COUNT: 14.1 %
EST. GFR  (AFRICAN AMERICAN): >60 ML/MIN/1.73 M^2
EST. GFR  (NON AFRICAN AMERICAN): >60 ML/MIN/1.73 M^2
GLUCOSE SERPL-MCNC: 212 MG/DL
HCT VFR BLD AUTO: 39 %
HGB BLD-MCNC: 12.9 G/DL
IMM GRANULOCYTES # BLD AUTO: 0.02 K/UL
IMM GRANULOCYTES NFR BLD AUTO: 0.5 %
LYMPHOCYTES # BLD AUTO: 0.8 K/UL
LYMPHOCYTES NFR BLD: 20.9 %
MCH RBC QN AUTO: 29.4 PG
MCHC RBC AUTO-ENTMCNC: 33.1 G/DL
MCV RBC AUTO: 89 FL
MONOCYTES # BLD AUTO: 0.5 K/UL
MONOCYTES NFR BLD: 12.2 %
NEUTROPHILS # BLD AUTO: 2.6 K/UL
NEUTROPHILS NFR BLD: 64.5 %
NRBC BLD-RTO: 0 /100 WBC
PHOSPHATE SERPL-MCNC: 3.2 MG/DL
PLATELET # BLD AUTO: 151 K/UL
PMV BLD AUTO: 9.3 FL
POTASSIUM SERPL-SCNC: 4.9 MMOL/L
RBC # BLD AUTO: 4.39 M/UL
SODIUM SERPL-SCNC: 134 MMOL/L
WBC # BLD AUTO: 4.01 K/UL

## 2018-01-24 PROCEDURE — 85025 COMPLETE CBC W/AUTO DIFF WBC: CPT

## 2018-01-24 PROCEDURE — 99214 OFFICE O/P EST MOD 30 MIN: CPT | Mod: S$PBB,,, | Performed by: NURSE PRACTITIONER

## 2018-01-24 PROCEDURE — 99999 PR PBB SHADOW E&M-EST. PATIENT-LVL IV: CPT | Mod: PBBFAC,,, | Performed by: NURSE PRACTITIONER

## 2018-01-24 PROCEDURE — 80069 RENAL FUNCTION PANEL: CPT

## 2018-01-24 PROCEDURE — 99214 OFFICE O/P EST MOD 30 MIN: CPT | Mod: PBBFAC | Performed by: NURSE PRACTITIONER

## 2018-01-24 PROCEDURE — 87799 DETECT AGENT NOS DNA QUANT: CPT

## 2018-01-24 RX ORDER — NAPROXEN SODIUM 220 MG/1
81 TABLET, FILM COATED ORAL DAILY
Status: ON HOLD | COMMUNITY
End: 2018-11-21 | Stop reason: CLARIF

## 2018-01-24 RX ORDER — NIFEDIPINE 30 MG/1
30 TABLET, EXTENDED RELEASE ORAL DAILY
Qty: 30 TABLET | Refills: 11 | Status: SHIPPED | OUTPATIENT
Start: 2018-01-24 | End: 2019-02-04 | Stop reason: SDUPTHER

## 2018-01-24 RX ORDER — PRASUGREL HYDROCHLORIDE 10 MG/1
10 TABLET, COATED ORAL DAILY
COMMUNITY
End: 2020-07-22

## 2018-01-24 RX ORDER — LOSARTAN POTASSIUM 50 MG/1
100 TABLET ORAL DAILY
COMMUNITY

## 2018-01-24 NOTE — PROGRESS NOTES
Kidney Post-Transplant Assessment    Referring Physician: Faraz Yao  Current Nephrologist: Shai Moore    ORGAN: LEFT KIDNEY  Donor Type:  - brain death  PHS Increased Risk: no  Cold Ischemia: 875 mins  Induction Medications: campath - alemtuzumab (anti-cd52)    Subjective:     CC:  Reassessment of renal allograft function and management of chronic immunosuppression.    HPI:  Mr. Burgess is a 57 y.o. year old  or Other  male who received a  - brain death kidney transplant on 13.  (induction: Campath, CMV status: D+, R-,  bridged with heparin postoperatively for his history of antiphospholipid antibody syndrome) . Immunosuppression with Prograf and Cellcept. His baseline creatinine has been 1.0-1.3.   He has CKD stage 2 - GFR 60-89 and his baseline creatinine is between 1.0-1.3, most recent sCr 1.2. He takes mycophenolate mofetil and tacrolimus for maintenance immunosuppression.  Recent hospitalizations or ER visits since his previous clinic visit. See notes below:     Kidney HX:  Patient developed proteinuria of >4 gram for which his nephrologist contacted [facility] transplant for possible kidney biopsy in 2017. 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 No ACR, AVR, microcirculation changes, c4d negative. IF consistent with membranous pattern. PLA2R, kappa and lambda stains pending.  17: biopsy discussed with [place]; findings suggestive of secondary cause of MN (negative PLA2R IF staining, fibrocellular crescent).     Rituxan tx  were initially delayed due to insurance issues, completing cancer screening work up and the patient needing fistula surgery . Ritux TX completed on  17, 2017, 17, and  2017  The next 2 doses are on hold  until labs today are back    Interval HX  Overall feels well. No health concerns today.   Denies chest pain, SOB, leg pain, abdominal pain or LUTs.  Reports BP elevated at home,  running 150-160s/90s  He is taking Lopressor BID and Cozaar daily.  He has clonidine pills to take PRN if BP gets in the 180s. He has not needed these. His wife is at the clinic visit.       Past Medical History:   Diagnosis Date    Anemia of chronic renal failure     Anticoagulant long-term use     Antiphospholipid antibody syndrome     pulmonary embolism and arterial occlusions    Asthma     CKD (chronic kidney disease) 2013    CKD (chronic kidney disease), stage II 10/9/2013    Coronary artery disease     -donor kidney transplant for GN 13    Elevated glucose 2013    ESRD (end stage renal disease) 2005    secondary to glomerulonephritis    Hyperglycemia 2013    Hyperlipidemia     Hypertension 2014    Hypertension, renal     Hypophosphatemia     Hypothyroidism     Kidney stone     Myocardial infarction     Neutropenia, drug-induced 2013    Prophylactic immunotherapy for kidney transplant     PVD (peripheral vascular disease)     s/p right fem-pop    Secondary hyperparathyroidism of renal origin        Review of Systems   Constitutional: Negative for activity change, appetite change, chills, fatigue, fever and unexpected weight change.   HENT: Negative for congestion, facial swelling, postnasal drip, rhinorrhea, sinus pressure, sore throat and trouble swallowing.    Eyes: Negative for pain, redness and visual disturbance.   Respiratory: Negative for cough, chest tightness, shortness of breath and wheezing.    Cardiovascular: Negative.  Negative for chest pain, palpitations and leg swelling.   Gastrointestinal: Negative for abdominal pain, diarrhea, nausea and vomiting.   Genitourinary: Negative for dysuria, flank pain and  urgency.   Musculoskeletal: Negative for gait problem, neck pain and neck stiffness.   Skin: Negative for rash.   Allergic/Immunologic: Positive for immunocompromised state. Negative for environmental allergies and food allergies.   Neurological: Negative for dizziness, weakness, light-headedness and headaches.   Psychiatric/Behavioral: Negative for agitation and confusion. The patient is not nervous/anxious.        Objective:     Blood pressure (!) 169/91, pulse 60, temperature 97.6 °F (36.4 °C), temperature source Oral, resp. rate 18, height 6' (1.829 m), weight 103.8 kg (228 lb 13.4 oz), SpO2 100 %.body mass index is 31.04 kg/m².    Physical Exam   Constitutional: He is oriented to person, place, and time. He appears well-developed and well-nourished.   HENT:   Head: Normocephalic.   Mouth/Throat: Oropharynx is clear and moist. No oropharyngeal exudate.   Eyes: Conjunctivae and EOM are normal. Pupils are equal, round, and reactive to light. No scleral icterus.   Neck: Normal range of motion. Neck supple.   Cardiovascular: Normal rate, regular rhythm and normal heart sounds.    Pulmonary/Chest: Effort normal and breath sounds normal.   Abdominal: Soft. Normal appearance and bowel sounds are normal. He exhibits no distension and no mass. There is no splenomegaly or hepatomegaly. There is no tenderness. There is no rebound, no guarding, no CVA tenderness, no tenderness at McBurney's point and negative Mahan's sign.       Musculoskeletal: Normal range of motion. He exhibits no edema.   Lymphadenopathy:     He has no cervical adenopathy.   Neurological: He is alert and oriented to person, place, and time. He exhibits normal muscle tone. Coordination normal.   Skin: Skin is warm and dry.   Psychiatric: He has a normal mood and affect. His behavior is normal.   Vitals reviewed.      Labs:  Lab Results   Component Value Date    WBC 3.69 (L) 12/29/2017    HGB 11.8 (L) 12/29/2017    HCT 35.3 (L) 12/29/2017     (L)  2017    K 4.7 2017     2017    CO2 24 2017    BUN 30 (H) 2018    CREATININE 1.2 2018    EGFRNONAA >60.0 2018    CALCIUM 8.9 2018    PHOS 3.2 2018    MG 1.6 2017    ALBUMIN 1.6 (L) 2018    AST 43 (H) 2013    ALT 69 (H) 2013    UTPCR 5.24 (H) 2017    PTH 94 (H) 2013    TACROLIMUS 5.2 2018       Lab Results   Component Value Date    EXTANC  2015      Comment:      clinic 7/15/15    EXTWBC 5.4 2017    EXTSEGS 74 2017    EXTPLATELETS 188 2017    EXTHEMOGLOBI 10.2 (L) 2017    EXTHEMATOCRI 30.6 (L) 2017    EXTCREATININ 1.2 2017    EXTSODIUM 131 (L) 2017    EXTPOTASSIUM 5.1 (H) 2017    EXTBUN 25 2017    EXTCO2 28 2017    EXTCALCIUM 8.8 2017    EXTPHOSPHORU 3.4 2017    EXTGLUCOSE 179 (H) 2017    EXTALBUMIN 2.2 (L) 2017    EXTAST 18 2017    EXTALT 16 2017    EXTBILITOTAL 0.1 (L) 2017       Lab Results   Component Value Date    EXTSIROLIMUS 5.5 2016    EXTTACROLVL 3.1 2017    EXTPROTCRE 2.47 (H) 2017    EXTPTHINTACT 70.6 (A) 2017    EXTPROTEINUA 3+ 2017    EXTWBCUA neg 2014    EXTRBCUA 0-3 2017       Labs were reviewed with the patient.    Assessment:     1. -donor kidney transplant for GN 13    2. Chronic immunosuppression with Prograf and Cellcept    3. Hypertension, renal    4. Membranous nephropathy determined by biopsy    5. PVD (peripheral vascular disease)    6. Secondary hyperparathyroidism of renal origin    7. Impaired fasting glucose    8. Antiphospholipid antibody syndrome on indefinite anticoagulation    9. Proteinuria, unspecified type        Plan:    BK, CMV , UPC and repeat labs pending  Hold retux until Labs are reviewed by [facility] nephrologist    BP elevated-->Start Nifedipine 30 mg QD, hold if BP , 120/80    Labs today to be reviewed with laura  nephrologist to discuss plan of care      Follow-up:   1. CKD stage: 2  stable    2. Immunosuppression:   Prograf trough 5.2, which is  therapeutic target 4-6. Continue  Prograf 1/1,  Mg BID. Will continue to monitor for drug toxicities    3. Allograft Function: Stable. Continue good po hydration.   CREATININE 1.2 01/24/2018   EGFRNONAA >60.0 01/24/2018     4. Hypertension management: advise low salt diet and home BP monitoring    Lopressor 100 mg BID , losartan 50 mg Qd  BP elevated-->Start Nifedipine 30 mg QD, hold if BP , 120/80    5. Metabolic Bone Disease/Secondary Hyperparathyroidism:stable  Will monitor PTH, CA and Vit D/guidelines,    Mg ox 400 mg qd, Ergocalciferol 50,000 Units / week--> as prescribed   CALCIUM 8.9 01/24/2018   PHOS 3.2 01/24/2018       6. Electrolytes:  Will monitor /guidelines   (L) 12/29/2017   K 4.7 12/29/2017    12/29/2017   CO2 24 12/29/2017   BUN 30 (H) 01/24/2018       7. Anemia: stable. No need for intervention    Will monitor /guidelines  WBC 3.69 (L) 12/29/2017   HGB 11.8 (L) 12/29/2017   HCT 35.3 (L) 12/29/2017       8.  Cytopenias: no significant cytopenias will monitor as per our guidelines. Medicine list reviewed including potential causes of drug-induced cytopenias    9.Proteinuria: continue p/c ratio as per guidelines    1/24/2018 UPC pending  UTPCR 5.24 (H) 12/29/2017     10. BK and cmv  virus infection screening:  will continue to monitor/ guidelines  1/24/2018 CMV and BK pending     11. Weight education: provided during the clinic visit   Body mass index is 31.04 kg/m².          12.Patient safety education regarding immunosuppression including prophylaxis posttransplant for CMV, PCP : Education provided about vaccination and prevention of infections            Follow-up:   Clinic: return to transplant clinic weekly for the first month after transplant; every 2 weeks during months 2-3; then at 6-, 9-, 12-, 18-, 24-, and 36- months post-transplant to  reassess for complications from immunosuppression toxicity and monitor for rejection.  Annually thereafter.    Labs: since patient remains at high risk for rejection and drug-related complications that warrant close monitoring, labs will be ordered as follows: continue twice weekly CBC, renal panel, and drug level for first month; then same labs once weekly through 3rd month post-transplant.  Urine for UA and protein/creatinine ratio monthly.  Urine BK - PCR at 1-, 3-, 6-, 9-, 12-, 18-, 24-, and 36- months post-transplant.  Hepatic panel at 1-, 2-, 3-, 6-, 9-, 12-, 18-, 24-, and 36- months post-transplant.    Rachel Heck NP       Education:   Material provided to the patient.  Patient reminded to call with any health changes since these can be early signs of significant complications.  Also, I advised the patient to be sure any new medications or changes of old medications are discussed with either a pharmacist or physician knowledgeable with transplant to avoid rejection/drug toxicity related to significant drug interactions.    UNOS Patient Status  Functional Status: 80% - Normal activity with effort: some symptoms of disease  Physical Capacity: No Limitations

## 2018-01-24 NOTE — LETTER
January 24, 2018        Shai Moore  [address] [place], [place] [address]VD  [place] [address]  Phone: [phone]  Fax: [phone]             [facility]  [address]  [facility] [address]  Phone: [phone]   Patient: Anthony Burgess   MR Number: [id number]   YOB: 1960   Date of Visit: 1/24/2018       Dear Dr. Shai Moore    Thank you for referring Anthony Burgess to me for evaluation. Attached you will find relevant portions of my assessment and plan of care.    If you have questions, please do not hesitate to call me. I look forward to following Anthony Burgess along with you.    Sincerely,    Rachel Heck, NP    Enclosure    If you would like to receive this communication electronically, please contact [email or web address] or [phone] to request [employer] access.    At[employer] Link is a tool which provides read-only access to select patient information with whom you have a relationship. Its easy to use and provides real time access to review your patients record including encounter summaries, notes, results, and demographic information.    If you feel you have received this communication in error or would no longer like to receive these types of communications, please e-mail [email or web address]

## 2018-01-25 LAB — CMV DNA SERPL NAA+PROBE-ACNC: NORMAL IU/ML

## 2018-01-26 LAB
BK VIRUS DNA PCR, QUANT, BLOOD: <125 COPIES/ML
BK VIRUS DNA, BLOOD: NOT DETECTED
LOG BKV COPIES/ML: <2.1 LOG (10) COPIES/ML

## 2018-02-07 ENCOUNTER — TELEPHONE (OUTPATIENT)
Dept: TRANSPLANT | Facility: CLINIC | Age: 58
End: 2018-02-07

## 2018-02-07 RX ORDER — MEPERIDINE HYDROCHLORIDE 50 MG/ML
25 INJECTION INTRAMUSCULAR; INTRAVENOUS; SUBCUTANEOUS
Status: CANCELLED | OUTPATIENT
Start: 2018-02-07

## 2018-02-07 RX ORDER — SODIUM CHLORIDE 0.9 % (FLUSH) 0.9 %
10 SYRINGE (ML) INJECTION
Status: CANCELLED | OUTPATIENT
Start: 2018-02-07

## 2018-02-07 RX ORDER — HEPARIN 100 UNIT/ML
500 SYRINGE INTRAVENOUS
Status: CANCELLED | OUTPATIENT
Start: 2018-02-07

## 2018-02-07 RX ORDER — DIPHENHYDRAMINE HCL 25 MG
25 CAPSULE ORAL ONCE
Status: CANCELLED
Start: 2018-02-07 | End: 2018-02-07

## 2018-02-07 RX ORDER — ACETAMINOPHEN 325 MG/1
650 TABLET ORAL
Status: CANCELLED | OUTPATIENT
Start: 2018-02-07

## 2018-02-07 NOTE — TELEPHONE ENCOUNTER
----- Message from Sherrie Castillo MD sent at 2/7/2018 12:22 PM CST -----  Regarding: FW: Therapy plan for Rituxan       ----- Message -----  From: Ryan Mckeon PharmD  Sent: 2/7/2018  12:11 PM  To: Sherrie Castillo MD  Subject: RE: Therapy plan for Rituxan                     I placed the therapy plan, however it won't let me sign it.  Says I am not authorized to sign Rituximab (maybe because it's chemo?).  I signed all the other orders with it.  Dr. Castillo, could you go in to an orders only encounter and go to therapy plan, then sign the rituximab order?    Thanks,   Ryan Mckeon, Pharm.DPedro Pablo, BCPS  [id number]        ----- Message -----  From: Sherrie Castillo MD  Sent: 2/7/2018  11:55 AM  To: Lito Perry RN, Ryan Mckeon PharmD  Subject: RE: Therapy plan for Rituxan                     Yes please! Ritux monthly for 2 more doses. Thank you!  ----- Message -----  From: Ryan Mckeon PharmD  Sent: 2/7/2018  11:38 AM  To: Sherrie Castillo MD, Lito Perry RN  Subject: RE: Therapy plan for Rituxan                     This patient already had Rituximab therapy plan entered.  He received a dose on 12/28 and 1/11/18. Do you want me to add 2 more doses?     Thanks,   Ryan Mckeon PharmREYNALDO, BCPS  [id number]    ----- Message -----  From: Lito Perry RN  Sent: 2/6/2018  10:32 PM  To: [facility] Pharmacists  Subject: Therapy plan for Rituxan                         Please provide treatment plan.  Thank you!  ----- Message -----  From: Lito Perry RN  Sent: 1/26/2018   8:32 AM  To: Lito Perry RN        ----- Message -----  From: Sherrie Castillo MD  Sent: 1/25/2018  10:23 PM  To: [facility] Post-Kidney Transplant Clinical    Plan for ritux monthly for 2 doses. UPCR ratio monthly.

## 2018-02-08 ENCOUNTER — PATIENT MESSAGE (OUTPATIENT)
Dept: TRANSPLANT | Facility: CLINIC | Age: 58
End: 2018-02-08

## 2018-02-08 RX ORDER — DIPHENHYDRAMINE HCL 25 MG
25 CAPSULE ORAL ONCE
Status: CANCELLED
Start: 2018-02-08 | End: 2018-02-08

## 2018-02-08 RX ORDER — SODIUM CHLORIDE 0.9 % (FLUSH) 0.9 %
10 SYRINGE (ML) INJECTION
Status: CANCELLED | OUTPATIENT
Start: 2018-02-08

## 2018-02-08 RX ORDER — ACETAMINOPHEN 325 MG/1
650 TABLET ORAL
Status: CANCELLED | OUTPATIENT
Start: 2018-02-08

## 2018-02-08 RX ORDER — MEPERIDINE HYDROCHLORIDE 50 MG/ML
25 INJECTION INTRAMUSCULAR; INTRAVENOUS; SUBCUTANEOUS
Status: CANCELLED | OUTPATIENT
Start: 2018-02-08

## 2018-02-09 ENCOUNTER — SOCIAL WORK (OUTPATIENT)
Dept: TRANSPLANT | Facility: CLINIC | Age: 58
End: 2018-02-09

## 2018-02-09 NOTE — PROGRESS NOTES
Per RN Coordinator's request, SW made reservation for pt to stay at [facility] on 2/14/18 and 2/15/18.  He is being treated with Rituxan as in the past.  SW spoke with pt and wife and faxed financial profile form to pt's wife for completion.  Pt and wife verbalized understanding and agreement with plan.  SW remains available.

## 2018-02-09 NOTE — PROGRESS NOTES
"SW received request from pt's coordinator regarding 4 hour treatment of Ritxan and needing to stay locally for treatment. Pt's wife reported     "The ride takes about 3-4 hours depending on traffic. Just going and coming straight back is very hard on us and with the 4 hour treatment on top of that, it would magnify that.  For me, I have a pinched nerve in my back that also causes neuropathy in my left leg so prolonged sitting and riding is very hard on me plus I have myasthenia gravis, so I get totally wiped out every time we have to make a trip over there".     KASSI made reservation for February 14, check out date February 16. SW remains available.   "

## 2018-02-12 ENCOUNTER — TELEPHONE (OUTPATIENT)
Dept: TRANSPLANT | Facility: HOSPITAL | Age: 58
End: 2018-02-12

## 2018-02-12 NOTE — TELEPHONE ENCOUNTER
KASSI contacted pt at request of coordinator regarding financial profile needed at hotel check-in. KASSI contacted pt and stated pt may provide form at [facility]  at check-in. Pt's wife verbalized understanding and agreement. SW remains available.

## 2018-02-15 ENCOUNTER — INFUSION (OUTPATIENT)
Dept: INFUSION THERAPY | Facility: HOSPITAL | Age: 58
End: 2018-02-15
Attending: INTERNAL MEDICINE
Payer: MEDICARE

## 2018-02-15 VITALS
WEIGHT: 228.81 LBS | RESPIRATION RATE: 16 BRPM | SYSTOLIC BLOOD PRESSURE: 153 MMHG | HEART RATE: 53 BPM | HEIGHT: 72 IN | DIASTOLIC BLOOD PRESSURE: 70 MMHG | BODY MASS INDEX: 30.99 KG/M2

## 2018-02-15 DIAGNOSIS — D68.61 ANTIPHOSPHOLIPID ANTIBODY SYNDROME: ICD-10-CM

## 2018-02-15 DIAGNOSIS — N02.2 MEMBRANOUS NEPHROPATHY DETERMINED BY BIOPSY: Primary | ICD-10-CM

## 2018-02-15 PROCEDURE — 96415 CHEMO IV INFUSION ADDL HR: CPT

## 2018-02-15 PROCEDURE — 96413 CHEMO IV INFUSION 1 HR: CPT

## 2018-02-15 PROCEDURE — 25000003 PHARM REV CODE 250: Performed by: INTERNAL MEDICINE

## 2018-02-15 PROCEDURE — 63600175 PHARM REV CODE 636 W HCPCS: Mod: JG | Performed by: INTERNAL MEDICINE

## 2018-02-15 RX ORDER — HEPARIN 100 UNIT/ML
500 SYRINGE INTRAVENOUS
Status: CANCELLED | OUTPATIENT
Start: 2018-02-15

## 2018-02-15 RX ORDER — ACETAMINOPHEN 325 MG/1
650 TABLET ORAL
Status: CANCELLED | OUTPATIENT
Start: 2018-02-15

## 2018-02-15 RX ORDER — SODIUM CHLORIDE 0.9 % (FLUSH) 0.9 %
10 SYRINGE (ML) INJECTION
Status: CANCELLED | OUTPATIENT
Start: 2018-02-15

## 2018-02-15 RX ORDER — ACETAMINOPHEN 325 MG/1
650 TABLET ORAL
Status: COMPLETED | OUTPATIENT
Start: 2018-02-15 | End: 2018-02-15

## 2018-02-15 RX ORDER — DIPHENHYDRAMINE HCL 25 MG
25 CAPSULE ORAL ONCE
Status: CANCELLED
Start: 2018-02-15 | End: 2018-02-15

## 2018-02-15 RX ORDER — SODIUM CHLORIDE 0.9 % (FLUSH) 0.9 %
10 SYRINGE (ML) INJECTION
Status: DISCONTINUED | OUTPATIENT
Start: 2018-02-15 | End: 2018-02-15 | Stop reason: HOSPADM

## 2018-02-15 RX ORDER — DIPHENHYDRAMINE HCL 25 MG
25 CAPSULE ORAL ONCE
Status: COMPLETED | OUTPATIENT
Start: 2018-02-15 | End: 2018-02-15

## 2018-02-15 RX ORDER — MEPERIDINE HYDROCHLORIDE 50 MG/ML
25 INJECTION INTRAMUSCULAR; INTRAVENOUS; SUBCUTANEOUS
Status: CANCELLED | OUTPATIENT
Start: 2018-02-15

## 2018-02-15 RX ORDER — MEPERIDINE HYDROCHLORIDE 50 MG/ML
25 INJECTION INTRAMUSCULAR; INTRAVENOUS; SUBCUTANEOUS
Status: DISCONTINUED | OUTPATIENT
Start: 2018-02-15 | End: 2018-02-15 | Stop reason: HOSPADM

## 2018-02-15 RX ADMIN — ACETAMINOPHEN 650 MG: 325 TABLET, FILM COATED ORAL at 11:02

## 2018-02-15 RX ADMIN — RITUXIMAB 863 MG: 10 INJECTION, SOLUTION INTRAVENOUS at 11:02

## 2018-02-15 RX ADMIN — DIPHENHYDRAMINE HYDROCHLORIDE 25 MG: 25 CAPSULE ORAL at 11:02

## 2018-02-15 NOTE — PLAN OF CARE
Problem: Patient Care Overview (Adult)  Goal: Plan of Care Review  Outcome: Ongoing (interventions implemented as appropriate)  Pt tolerated tx without complications. VSS. No s/s of reaction. Instructed to contact MD with any questions. AVS given to patient.

## 2018-02-15 NOTE — PLAN OF CARE
Problem: Chemotherapy Effects (Adult)  Goal: Signs and Symptoms of Listed Potential Problems Will be Absent, Minimized or Managed (Chemotherapy Effects)  Signs and symptoms of listed potential problems will be absent, minimized or managed by discharge/transition of care (reference Chemotherapy Effects (Adult) CPG).   Outcome: Ongoing (interventions implemented as appropriate)  Pt here for Rituxan #5. VSS. No complaints voiced. Consent/labs/meds/allergies reviewed. PIV to left hand with blood return noted. All questions answered. Will continue to monitor.

## 2018-03-07 ENCOUNTER — PATIENT MESSAGE (OUTPATIENT)
Dept: TRANSPLANT | Facility: CLINIC | Age: 58
End: 2018-03-07

## 2018-03-14 ENCOUNTER — SOCIAL WORK (OUTPATIENT)
Dept: TRANSPLANT | Facility: CLINIC | Age: 58
End: 2018-03-14

## 2018-03-14 NOTE — PROGRESS NOTES
KASSI placed a reservation at [facility] with $0 fee to pt for outpatient infusions. KASSI remains available to pt, pt's family, and transplant team at [phone].

## 2018-03-15 ENCOUNTER — TELEPHONE (OUTPATIENT)
Dept: TRANSPLANT | Facility: CLINIC | Age: 58
End: 2018-03-15

## 2018-03-15 ENCOUNTER — PATIENT MESSAGE (OUTPATIENT)
Dept: TRANSPLANT | Facility: CLINIC | Age: 58
End: 2018-03-15

## 2018-03-15 ENCOUNTER — INFUSION (OUTPATIENT)
Dept: INFUSION THERAPY | Facility: HOSPITAL | Age: 58
End: 2018-03-15
Attending: INTERNAL MEDICINE
Payer: MEDICARE

## 2018-03-15 VITALS
WEIGHT: 225.75 LBS | RESPIRATION RATE: 18 BRPM | DIASTOLIC BLOOD PRESSURE: 75 MMHG | HEART RATE: 51 BPM | TEMPERATURE: 98 F | HEIGHT: 72 IN | SYSTOLIC BLOOD PRESSURE: 135 MMHG | BODY MASS INDEX: 30.58 KG/M2

## 2018-03-15 DIAGNOSIS — D68.61 ANTIPHOSPHOLIPID ANTIBODY SYNDROME: ICD-10-CM

## 2018-03-15 DIAGNOSIS — N02.2 MEMBRANOUS NEPHROPATHY DETERMINED BY BIOPSY: Primary | ICD-10-CM

## 2018-03-15 DIAGNOSIS — Z94.0 KIDNEY REPLACED BY TRANSPLANT: Primary | ICD-10-CM

## 2018-03-15 DIAGNOSIS — R80.9 PROTEINURIA, UNSPECIFIED TYPE: ICD-10-CM

## 2018-03-15 DIAGNOSIS — Z94.0 KIDNEY REPLACED BY TRANSPLANT: ICD-10-CM

## 2018-03-15 LAB
CREAT UR-MCNC: 66 MG/DL
CREAT UR-MCNC: 66 MG/DL
PROT UR-MCNC: 555 MG/DL
PROT UR-MCNC: 555 MG/DL
PROT/CREAT RATIO, UR: 8.41
PROT/CREAT RATIO, UR: 8.41

## 2018-03-15 PROCEDURE — 82570 ASSAY OF URINE CREATININE: CPT

## 2018-03-15 PROCEDURE — 96413 CHEMO IV INFUSION 1 HR: CPT

## 2018-03-15 PROCEDURE — 63600175 PHARM REV CODE 636 W HCPCS: Mod: JG | Performed by: INTERNAL MEDICINE

## 2018-03-15 PROCEDURE — 25000003 PHARM REV CODE 250: Performed by: INTERNAL MEDICINE

## 2018-03-15 PROCEDURE — 96415 CHEMO IV INFUSION ADDL HR: CPT

## 2018-03-15 RX ORDER — DIPHENHYDRAMINE HCL 25 MG
25 CAPSULE ORAL ONCE
Status: CANCELLED
Start: 2018-03-15 | End: 2018-03-15

## 2018-03-15 RX ORDER — ACETAMINOPHEN 325 MG/1
650 TABLET ORAL
Status: COMPLETED | OUTPATIENT
Start: 2018-03-15 | End: 2018-03-15

## 2018-03-15 RX ORDER — MEPERIDINE HYDROCHLORIDE 50 MG/ML
25 INJECTION INTRAMUSCULAR; INTRAVENOUS; SUBCUTANEOUS
Status: CANCELLED | OUTPATIENT
Start: 2018-03-15

## 2018-03-15 RX ORDER — ACETAMINOPHEN 325 MG/1
650 TABLET ORAL
Status: CANCELLED | OUTPATIENT
Start: 2018-03-15

## 2018-03-15 RX ORDER — SODIUM CHLORIDE 0.9 % (FLUSH) 0.9 %
10 SYRINGE (ML) INJECTION
Status: DISCONTINUED | OUTPATIENT
Start: 2018-03-15 | End: 2018-03-15 | Stop reason: HOSPADM

## 2018-03-15 RX ORDER — HEPARIN 100 UNIT/ML
500 SYRINGE INTRAVENOUS
Status: CANCELLED | OUTPATIENT
Start: 2018-03-15

## 2018-03-15 RX ORDER — DIPHENHYDRAMINE HCL 25 MG
25 CAPSULE ORAL ONCE
Status: COMPLETED | OUTPATIENT
Start: 2018-03-15 | End: 2018-03-15

## 2018-03-15 RX ORDER — SODIUM CHLORIDE 0.9 % (FLUSH) 0.9 %
10 SYRINGE (ML) INJECTION
Status: CANCELLED | OUTPATIENT
Start: 2018-03-15

## 2018-03-15 RX ORDER — MEPERIDINE HYDROCHLORIDE 50 MG/ML
25 INJECTION INTRAMUSCULAR; INTRAVENOUS; SUBCUTANEOUS
Status: DISCONTINUED | OUTPATIENT
Start: 2018-03-15 | End: 2018-03-15 | Stop reason: HOSPADM

## 2018-03-15 RX ADMIN — DIPHENHYDRAMINE HYDROCHLORIDE 25 MG: 25 CAPSULE ORAL at 12:03

## 2018-03-15 RX ADMIN — RITUXIMAB 855 MG: 10 INJECTION, SOLUTION INTRAVENOUS at 01:03

## 2018-03-15 RX ADMIN — ACETAMINOPHEN 650 MG: 325 TABLET, FILM COATED ORAL at 12:03

## 2018-03-15 NOTE — TELEPHONE ENCOUNTER
Call placed to Mrs. Burgess.  Advised of need for labs this am per chemo infusion.  Patient to go to main lab now for stat labs and urine.

## 2018-03-15 NOTE — PLAN OF CARE
Problem: Patient Care Overview (Adult)  Goal: Discharge Needs Assessment  Outcome: Ongoing (interventions implemented as appropriate)  Tolerated rituxan well titrated to max dose no infusion related reactions noted, POC CBG obtained 308 reported to nurse murray rn coordinator transplant team notified pt to go to er for any s/s of hyperglycemia and to make appt with pcp to manage high glucose level. Pt verbalized understanding d/c home with wife ambulatory NAD noted. Instructed to contact MD office with any questions or concerns.

## 2018-03-15 NOTE — TELEPHONE ENCOUNTER
MD advised of glucose 426, non fasting.  Chemo infusion nurse contacted, she will repeat glucose with finger stick before discharge.  Patient without any complaints at this time.   Per MD will await repeat and add a1c to next labs.

## 2018-03-15 NOTE — TELEPHONE ENCOUNTER
----- Message from Buck Dang sent at 3/15/2018 10:17 AM CDT -----  Contact: patient   Patient returning a call but unsure who call and why.          Please call [phone]        Thanks

## 2018-03-19 RX ORDER — MYCOPHENOLATE MOFETIL 250 MG/1
CAPSULE ORAL
Qty: 120 CAPSULE | Refills: 11 | Status: SHIPPED | OUTPATIENT
Start: 2018-03-19 | End: 2019-03-19 | Stop reason: SDUPTHER

## 2018-03-21 ENCOUNTER — PATIENT MESSAGE (OUTPATIENT)
Dept: TRANSPLANT | Facility: CLINIC | Age: 58
End: 2018-03-21

## 2018-03-21 NOTE — PATIENT INSTRUCTIONS
Kavon Paz,  Dr. Castillo is off for the next week and a half, but I know she will be upset to hear Anthony is having such a hard time. I share your frustration and disappointment he is not doing well, and agree Anthony should focus on getting well locally before returning here. Please let us know when he is discharged, and if anything changes. We would like to get a copy of the discharge summary when he leaves the hospital, so we can keep up with what happened.  Best Wishes,  Dr. Zara Can (covering for Dr. Castillo)

## 2018-03-27 ENCOUNTER — PATIENT MESSAGE (OUTPATIENT)
Dept: TRANSPLANT | Facility: CLINIC | Age: 58
End: 2018-03-27

## 2018-04-04 ENCOUNTER — TELEPHONE (OUTPATIENT)
Dept: TRANSPLANT | Facility: CLINIC | Age: 58
End: 2018-04-04

## 2018-04-24 ENCOUNTER — PATIENT MESSAGE (OUTPATIENT)
Dept: TRANSPLANT | Facility: CLINIC | Age: 58
End: 2018-04-24

## 2018-05-03 LAB
EXT ALBUMIN: 2.4
EXT BUN: 23
EXT CALCIUM: 8.8
EXT CHLORIDE: 102
EXT CO2: 26
EXT CREATININE: 1.1 MG/DL
EXT EOSINOPHIL%: 3
EXT GFR MDRD NON AF AMER: 69
EXT GLUCOSE UA: ABNORMAL
EXT GLUCOSE: 153
EXT HEMATOCRIT: 38.9
EXT HEMOGLOBIN: 13.1
EXT LYMPH%: 17
EXT MONOCYTES%: 9
EXT NITRITES UA: ABNORMAL
EXT PHOSPHORUS: 3.2
EXT PLATELETS: 206
EXT POTASSIUM: 5
EXT PROT/CREAT RATIO UR: 6.1
EXT PROTEIN UA: ABNORMAL
EXT RBC UA: ABNORMAL
EXT SEGS%: 69
EXT SODIUM: 136 MMOL/L
EXT WBC UA: 0.1
EXT WBC: 5.8

## 2018-05-16 ENCOUNTER — DOCUMENTATION ONLY (OUTPATIENT)
Dept: TRANSPLANT | Facility: CLINIC | Age: 58
End: 2018-05-16

## 2018-05-16 ENCOUNTER — TELEPHONE (OUTPATIENT)
Dept: TRANSPLANT | Facility: CLINIC | Age: 58
End: 2018-05-16

## 2018-05-16 ENCOUNTER — PATIENT MESSAGE (OUTPATIENT)
Dept: TRANSPLANT | Facility: CLINIC | Age: 58
End: 2018-05-16

## 2018-05-16 NOTE — TELEPHONE ENCOUNTER
Results reviewed with patient wife and they would prefer to wait tell July tell follow up labs and clinic appointment.

## 2018-05-16 NOTE — PROGRESS NOTES
Proteinuria: 6.1.   Hematuria: RBC 4-10  K: 5.0  Cr: 1.2    No action at this point. Plan to see the patient some time in June. Please obtain the record of his surgery. Thank you!

## 2018-05-16 NOTE — TELEPHONE ENCOUNTER
----- Message from Sherrie Castillo MD sent at 5/16/2018  2:01 PM CDT -----  Proteinuria: 6.1.   Hematuria: RBC 4-10  K: 5.0  Cr: 1.2    No action at this point. Plan to see the patient some time in June. Please obtain the record of his surgery. Thank you!

## 2018-06-01 LAB
EXT ALBUMIN: 2
EXT ALKALINE PHOSPHATASE: 144
EXT ALT: 25
EXT AST: 21
EXT BILIRUBIN TOTAL: 0.4
EXT BUN: 17
EXT CALCIUM: 8.4
EXT CHLORIDE: 104
EXT CO2: 26
EXT CREATININE: 1.1 MG/DL
EXT EOSINOPHIL%: 6
EXT GLUCOSE: 161
EXT HEMATOCRIT: 42
EXT HEMOGLOBIN: 13.9
EXT LYMPH%: 18
EXT MONOCYTES%: 11
EXT PHOSPHORUS: 3.2
EXT PLATELETS: 166
EXT POTASSIUM: 4.3
EXT PROT/CREAT RATIO UR: 7
EXT PROTEIN TOTAL: 4.6
EXT PTH, INTACT: 89.7
EXT SEGS%: 64
EXT SODIUM: 137 MMOL/L
EXT TACROLIMUS LVL: 4.6
EXT WBC: 5

## 2018-06-22 NOTE — PROGRESS NOTES
Please make an Tx appointment with me at clinic in next 3-4 week. Please clarify total dose of ritux that he has received since last year. Thank you!

## 2018-07-08 RX ORDER — ERGOCALCIFEROL 1.25 MG/1
CAPSULE ORAL
Qty: 4 CAPSULE | Refills: 6 | Status: SHIPPED | OUTPATIENT
Start: 2018-07-08 | End: 2019-02-23 | Stop reason: SDUPTHER

## 2018-07-13 NOTE — ADDENDUM NOTE
Addended by: ABDULLAHI GARIBAY on: 11/9/2017 09:38 AM     Modules accepted: Orders     Your current Orthopedic problem we are working together to treat is:  Right left pain.      ~ IMAGING  Please schedule the following:  a CT Scan and a Bone Scan     Stop at reception to schedule or call [facility] Scheduling for Imaging - [phone] (all [facility] sites except [facility]). Due to the sensitive nature of our practice, we do request that an appointment be scheduled to discuss test results.      PLEASE NOTE: The insurance authorization process can take up to, and at times exceed 7 days. Your appointment may not be scheduled until that authorization has been obtained.     It is recommended you schedule a follow-up appointment with Randy Russ MD approximately 2-3 days after imaging studies.  Please call for an appointment when imaging has been scheduled.      Office hours are 8:00 am to 5:00 pm Monday through Friday. If it is urgent that you speak with someone outside of these hours, our [facility] Call Center will be able to assist you. You can reach the office by calling the [facility] scheduling line at [phone].     ** Please be aware of our Orthopedic Department refill policy in which you must allow 3 clinic days for a refill to be completed. Pain medications will not be refilled after business hours nor on weekends. **    Please feel free to visit our websites that may allow you to have a more thorough understanding of our goals of treatment, and allow interaction with your physician.    [email or web address]  [email or web address]  [email or web address]    We do highly recommend [facility], if you do not already have this. You can request access via the internet or by simply talking with a  at any of the clinics.   [email or web address].    Thank you for choosing [facility] as your Orthopedic provider!

## 2018-07-24 LAB
EXT ALBUMIN: 1.9
EXT ALKALINE PHOSPHATASE: 194
EXT ALT: 33
EXT AST: 24
EXT BILIRUBIN DIRECT: 0 MG/DL
EXT BILIRUBIN TOTAL: 0.1
EXT BUN: 27
EXT CALCIUM: 8.7
EXT CHLORIDE: 104
EXT CO2: 28
EXT CREATININE: 1.21 MG/DL
EXT EOSINOPHIL%: 4
EXT GFR MDRD AF AMER: >60
EXT GLUCOSE: 166
EXT HEMATOCRIT: 41
EXT HEMOGLOBIN: 13.8
EXT LYMPH%: 20
EXT MAGNESIUM: 1.8
EXT MONOCYTES%: 9
EXT PHOSPHORUS: 3.3
EXT PLATELETS: 238
EXT POTASSIUM: 4.3
EXT PROT/CREAT RATIO UR: 0.56
EXT PROTEIN TOTAL: 4.7
EXT SEGS%: 67
EXT SODIUM: 137 MMOL/L
EXT TACROLIMUS LVL: 5.4
EXT WBC: 5.4

## 2018-07-25 ENCOUNTER — DOCUMENTATION ONLY (OUTPATIENT)
Dept: TRANSPLANT | Facility: CLINIC | Age: 58
End: 2018-07-25

## 2018-08-03 ENCOUNTER — OFFICE VISIT (OUTPATIENT)
Dept: TRANSPLANT | Facility: CLINIC | Age: 58
End: 2018-08-03
Payer: MEDICARE

## 2018-08-03 ENCOUNTER — LAB VISIT (OUTPATIENT)
Dept: LAB | Facility: HOSPITAL | Age: 58
End: 2018-08-03
Attending: INTERNAL MEDICINE
Payer: MEDICARE

## 2018-08-03 VITALS
DIASTOLIC BLOOD PRESSURE: 86 MMHG | HEIGHT: 72 IN | OXYGEN SATURATION: 99 % | WEIGHT: 225.06 LBS | BODY MASS INDEX: 30.48 KG/M2 | RESPIRATION RATE: 17 BRPM | TEMPERATURE: 98 F | HEART RATE: 51 BPM | SYSTOLIC BLOOD PRESSURE: 149 MMHG

## 2018-08-03 DIAGNOSIS — Z72.89 OTHER PROBLEMS RELATED TO LIFESTYLE: ICD-10-CM

## 2018-08-03 DIAGNOSIS — N06.3 ISOLATED PROTEINURIA WITH DIFFUSE MESANGIAL PROLIFERATIVE GLOMERULONEPHRITIS: Primary | ICD-10-CM

## 2018-08-03 DIAGNOSIS — Z94.0 KIDNEY REPLACED BY TRANSPLANT: Primary | ICD-10-CM

## 2018-08-03 DIAGNOSIS — Z94.0 KIDNEY REPLACED BY TRANSPLANT: ICD-10-CM

## 2018-08-03 LAB
CREAT UR-MCNC: 127 MG/DL
PROT UR-MCNC: 884 MG/DL
PROT/CREAT UR: 6.96 MG/G{CREAT}

## 2018-08-03 PROCEDURE — 99999 PR PBB SHADOW E&M-EST. PATIENT-LVL IV: CPT | Mod: PBBFAC,,, | Performed by: INTERNAL MEDICINE

## 2018-08-03 PROCEDURE — 99215 OFFICE O/P EST HI 40 MIN: CPT | Mod: S$PBB,,, | Performed by: INTERNAL MEDICINE

## 2018-08-03 PROCEDURE — 84156 ASSAY OF PROTEIN URINE: CPT

## 2018-08-03 PROCEDURE — 99214 OFFICE O/P EST MOD 30 MIN: CPT | Mod: PBBFAC | Performed by: INTERNAL MEDICINE

## 2018-08-03 RX ORDER — PANTOPRAZOLE SODIUM 40 MG/1
40 TABLET, DELAYED RELEASE ORAL DAILY
COMMUNITY
End: 2020-07-22

## 2018-08-03 NOTE — PATIENT INSTRUCTIONS
Thank you for entrusting your transplant care to us, and visiting me today. Please:    - urine protein test  - Colonoscopy  - Monitor your blood pressure and aim to keep < 140/90  - See a dermatologist for skin screening test annually as you have a higher risk of skin  cancer after transplant.  - See your nephrologist regularly and follow his/her recommendations.

## 2018-08-03 NOTE — PROGRESS NOTES
Kidney Post-Transplant Assessment    Referring Physician: Faraz Yao  Current Nephrologist: Shai Moore    ORGAN: LEFT KIDNEY  Donor Type:  - brain death   PHS Increased Risk: no  Cold Ischemia: 875 mins  Induction Medications: campath - alemtuzumab (anti-cd52)  CMV Status:    Subjective:     CC:  Reassessment of renal allograft function and management of chronic immunosuppression.    Kidney History:    Kidney History: Mr. Burgess is a 58 y.o. year old  or Other  male who received a  - brain death kidney transplant on 13 for ? MPGN/? MN (induction: Campath, CMV status: D+, R-,  bridged with heparin postoperatively for his history of antiphospholipid antibody syndrome) . Immunosuppression with Prograf and Cellcept. His baseline creatinine has been 1.0-1.3.       Patient developed proteinuria of >4 gram for which patient underwent kidney allograft biopsy while he was bridged with heparin for his history of antiphospholipid syndrome at [facility] in 2017. 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 Last ritux was in 2018. Patient has developed AVF infection and DVTs in last couple months. Last UPCR from outside was 0.5 (previous UPCR: 7.0). I explained to the patient that 0.5 can be a lab error. Will repeat UPCR today. He was also asked to do colonoscopy. he denies any chest pain, shortness of breath, ENT symptoms, nausea/vomiting, dysuria, frequency, urgency, or pain over the allograft.        Medications:  Current Outpatient Prescriptions   Medication Sig Dispense Refill    aspirin 81 MG Chew Take 81 mg by mouth once daily.      atorvastatin (LIPITOR) 40 MG tablet Take 1 tablet (40 mg total) by mouth once daily. 1 Tablet Oral Every day 30 tablet 5    ergocalciferol (ERGOCALCIFEROL) 50,000 unit Cap TAKE ONE  CAPSULE BY MOUTH EVERY 7 DAYS. 4 capsule 6    levothyroxine (SYNTHROID) 50 MCG tablet Take 50 mcg by mouth once daily.      linagliptin (TRADJENTA) 5 mg Tab tablet Take 5 mg by mouth once daily.      losartan (COZAAR) 50 MG tablet Take 50 mg by mouth once daily.      magnesium oxide (MAG-OX) 400 mg tablet 800mg in AM PO and 400mg in PM PO.      metoprolol tartrate (LOPRESSOR) 50 MG tablet Take 100 mg by mouth 2 (two) times daily.       mycophenolate (CELLCEPT) 250 mg Cap TAKE (2) CAPSULES TWICE DAILY. 120 capsule 11    NIFEdipine (PROCARDIA-XL) 30 MG (OSM) 24 hr tablet Take 1 tablet (30 mg total) by mouth once daily. Hold if BP < 120/80 30 tablet 11    pantoprazole (PROTONIX) 40 MG tablet Take 40 mg by mouth once daily.      prasugrel (EFFIENT) 10 mg Tab Take 10 mg by mouth once daily.      tacrolimus (PROGRAF) 1 MG Cap Take 1 mg by mouth in the am and 1 mg by mouth in the pm 60 capsule 11    warfarin (COUMADIN) 5 MG tablet Take 7.5 mg by mouth on Monday, Wed, Thursday, Saturday and Sunday evenings. Take 10 mg by mouth on Tuesday and Friday evening. (Patient taking differently: Take 7.5 mg by mouth Daily. ) 24 tablet 1     No current facility-administered medications for this visit.            Review of Systems     Constitutional: Negative for activity change, appetite change, chills, fatigue, fever and unexpected weight change.   HENT: Negative for congestion, facial swelling, postnasal drip, rhinorrhea, sinus pressure, sore throat and trouble swallowing.    Eyes: Negative for pain, redness and visual disturbance.   Respiratory: Negative for cough, chest tightness, shortness of breath and wheezing.    Cardiovascular: Negative.  Negative for chest pain, palpitations and leg swelling.   Gastrointestinal: Negative for abdominal pain, diarrhea, nausea and vomiting.   Genitourinary: Negative for dysuria, flank pain and urgency.   Musculoskeletal: Negative for gait problem, neck pain and neck stiffness.    Skin: Negative for rash.   Allergic/Immunologic: Positive for immunocompromised state. Negative for environmental allergies and food allergies.   Neurological: Negative for dizziness, weakness, light-headedness and headaches.   Psychiatric/Behavioral: Negative for agitation and confusion. The patient is not nervous/anxious.              Objective:   Blood pressure (!) 149/86, pulse (!) 51, temperature 97.6 °F (36.4 °C), temperature source Oral, resp. rate 17, height 6' (1.829 m), weight 102.1 kg (225 lb 1.4 oz), SpO2 99 %.body mass index is 30.53 kg/m².    Physical Exam  developed and well-nourished.   Mouth/Throat: Oropharynx is clear and moist. No oropharyngeal exudate.   Eyes: Conjunctivae and EOM are normal. Pupils are equal, round, and reactive to light. No scleral icterus.   Neck: Normal range of motion. Neck supple.   Cardiovascular: Normal rate, regular rhythm and normal heart sounds.    Pulmonary/Chest: Effort normal and breath sounds normal.   Abdominal: Soft. Normal appearance and bowel sounds are normal. He exhibits no distension and no mass.  Musculoskeletal: Normal range of motion. He exhibits no edema.   Neurological: He is alert and oriented to person, place, and time. He exhibits normal muscle tone. Coordination normal.   Skin: Skin is warm and dry.   Psychiatric: He has a normal mood and affect. His behavior is normal.                Labs:  Lab Results   Component Value Date    WBC 6.43 03/15/2018    HGB 13.5 (L) 03/15/2018    HCT 39.3 (L) 03/15/2018     (L) 03/15/2018    K 4.8 03/15/2018    CL 99 03/15/2018    CO2 26 03/15/2018    BUN 24 (H) 03/15/2018    CREATININE 1.3 03/15/2018    EGFRNONAA >60.0 03/15/2018    CALCIUM 8.5 (L) 03/15/2018    PHOS 3.2 01/24/2018    MG 1.6 09/08/2017    ALBUMIN 1.6 (L) 01/24/2018    AST 43 (H) 07/21/2013    ALT 69 (H) 07/21/2013       Lab Results   Component Value Date    EXTANC  07/08/2015      Comment:      clinic 7/15/15    EXTWBC 5.4 07/24/2018     EXTSEGS 67 07/24/2018    EXTPLATELETS 238 07/24/2018    EXTHEMOGLOBI 13.8 07/24/2018    EXTHEMATOCRI 41.0 07/24/2018    EXTCREATININ 1.21 07/24/2018    EXTSODIUM 137 07/24/2018    EXTPOTASSIUM 4.3 07/24/2018    EXTBUN 27 (H) 07/24/2018    EXTCO2 28 07/24/2018    EXTCALCIUM 8.7 07/24/2018    EXTPHOSPHORU 3.3 07/24/2018    EXTGLUCOSE 166 (H) 07/24/2018    EXTALBUMIN 1.9 (L) 07/24/2018    EXTAST 24 07/24/2018    EXTALT 33 07/24/2018    EXTBILITOTAL 0.1 (L) 07/24/2018       Lab Results   Component Value Date    EXTSIROLIMUS 5.5 07/14/2016    EXTTACROLVL 5.4 07/24/2018    EXTPROTCRE 0.56 07/24/2018    EXTPTHINTACT 89.7 (H) 06/01/2018    EXTPROTEINUA 3+ 05/03/2018    EXTWBCUA 0.1 05/03/2018    EXTRBCUA 4-10 (A) 05/03/2018       Labs were reviewed with the patient.    Assessment/Plan:     1. 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 Burgess is a 58 y.o. male with:     # History of ESRD presumed secondary to ? MPGN/ ? MN   - s/p DDKT in 2013  - baseline Cr 1.0 to 1.3  - last Cr 1.0   - Pending UPC ratio from today   - Continue ARB     # Proteinuria  - Kidney biopsy : MN and focal acute tubular injury. Requested native kidney biopsy from nephrology office, they don't have it. It is not clear whether this MN is de sara or recurrent.    - Serum PLA2R positive few months ago  - Pt has had 6 doses of rituximab.   - Repeating urine protein creatinine ratio today. patient was talked regarding kidney biopsy    # Immunosuppression:   - continue Prograf, prograf   - continue  mg BID   - will monitor closely for toxicities       # HTN:   - BPs well controlled   - continue with home blood pressure monitoring   - low salt and healthy life discussed with the patient     # Metabolic Bone Disease/Secondary Hyperparathyroidism:  - PTH 86  - Ca 8.3, corrected Ca 9.9, phos 3.1  - vitamin D 5, on ergo 66195 units weekly       # DVT  - on warfarin      # DM  - follow up with  endo    Follow-up:   - in 4 months       Plan:  Acute hepatitis panel. HIV, RPR  PLA2R  UPCR  Colonoscopy  Dermatology consult  Possible kidney biopsy

## 2018-08-03 NOTE — LETTER
August 3, 2018        Shai Moore  [address] [place], [place] [address]VD  [place] [address]  Phone: [phone]  Fax: [phone]             [facility]  [address]  [facility] [address]  Phone: [phone]   Patient: Anthony Burgess   MR Number: [id number]   YOB: 1960   Date of Visit: 8/3/2018       Dear Dr. Shai Moore    Thank you for referring Anthony Burgess to me for evaluation. Attached you will find relevant portions of my assessment and plan of care.    If you have questions, please do not hesitate to call me. I look forward to following Anthony Burgess along with you.    Sincerely,    Sherrie Castillo MD    Enclosure    If you would like to receive this communication electronically, please contact [email or web address] or [phone] to request [employer] access.    [employer] Link is a tool which provides read-only access to select patient information with whom you have a relationship. Its easy to use and provides real time access to review your patients record including encounter summaries, notes, results, and demographic information.    If you feel you have received this communication in error or would no longer like to receive these types of communications, please e-mail [email or web address]

## 2018-08-05 NOTE — PROGRESS NOTES
Proteinuria ~ 7 gram. Pending serology and colonoscopy. We will consider kidney biopsy in 1-2 weeks based on patient's convenience . Last kidney biopsy was in April 2017.

## 2018-08-07 ENCOUNTER — TELEPHONE (OUTPATIENT)
Dept: TRANSPLANT | Facility: CLINIC | Age: 58
End: 2018-08-07

## 2018-08-07 NOTE — TELEPHONE ENCOUNTER
----- Message from Sherrie Castillo MD sent at 8/4/2018  8:43 PM CDT -----  Proteinuria ~ 7 gram. Pending serology and colonoscopy. We will consider kidney biopsy in 1-2 weeks based on patient's convenience . Last kidney biopsy was in April 2017.

## 2018-08-07 NOTE — TELEPHONE ENCOUNTER
Patient wife Brandi repeated back and voice a understanding of orders and states will discuss with patient when we could schedule the kidney transplant biopsy and call coordinator back.

## 2018-08-15 ENCOUNTER — TELEPHONE (OUTPATIENT)
Dept: TRANSPLANT | Facility: CLINIC | Age: 58
End: 2018-08-15

## 2018-08-15 ENCOUNTER — PATIENT MESSAGE (OUTPATIENT)
Dept: TRANSPLANT | Facility: CLINIC | Age: 58
End: 2018-08-15

## 2018-08-15 NOTE — TELEPHONE ENCOUNTER
Spoke with patient wife and states patient is schedule to see Dr FRANSICO Moore next week 8/24/18 and would like to see him first before scheduling a kidney transplant biopsy.Patient and wife to keep coordinator posted.

## 2018-08-29 ENCOUNTER — PATIENT MESSAGE (OUTPATIENT)
Dept: TRANSPLANT | Facility: CLINIC | Age: 58
End: 2018-08-29

## 2018-09-18 LAB
CREAT 24H UR-MRATE: 105.6 MG/DL
CREAT CL/1.73 SQ M 12H UR+SERPL-ARVRAT: 72 ML/MIN
EXT CREATININE: 1.2 MG/DL
EXT GFR MDRD NON AF AMER: >60
Lab: 1500 ML
Lab: 211
PROT 24H UR-MRATE: 3165 G/(24.H)

## 2018-09-19 ENCOUNTER — DOCUMENTATION ONLY (OUTPATIENT)
Dept: TRANSPLANT | Facility: CLINIC | Age: 58
End: 2018-09-19

## 2018-09-21 ENCOUNTER — TELEPHONE (OUTPATIENT)
Dept: TRANSPLANT | Facility: CLINIC | Age: 58
End: 2018-09-21

## 2018-09-21 ENCOUNTER — DOCUMENTATION ONLY (OUTPATIENT)
Dept: TRANSPLANT | Facility: CLINIC | Age: 58
End: 2018-09-21

## 2018-09-21 NOTE — TELEPHONE ENCOUNTER
Patient repeated back and voice a understanding of orders and states he would like to have the biopsy schedule for November 19,20,21.

## 2018-09-21 NOTE — PROGRESS NOTES
His proteinuria is 3.1 gram/day. His nephrologist to be informed that he has significant proteinuria and we recommend kidney allograft biopsy.

## 2018-09-21 NOTE — TELEPHONE ENCOUNTER
Patient wife Brandi repeated back and voice a understanding of orders. Brandi states Mr Burgess is not presently at home but will give him the message to call coordinator to discuss results and plan.

## 2018-09-21 NOTE — TELEPHONE ENCOUNTER
----- Message from Sherrie Castillo MD sent at 9/21/2018  9:18 AM CDT -----  His proteinuria is 3.1 gram/day. His nephrologist to be informed that he has significant proteinuria and we recommend kidney allograft biopsy.

## 2018-10-30 ENCOUNTER — PATIENT MESSAGE (OUTPATIENT)
Dept: TRANSPLANT | Facility: CLINIC | Age: 58
End: 2018-10-30

## 2018-11-09 ENCOUNTER — PATIENT MESSAGE (OUTPATIENT)
Dept: TRANSPLANT | Facility: CLINIC | Age: 58
End: 2018-11-09

## 2018-11-12 ENCOUNTER — PATIENT MESSAGE (OUTPATIENT)
Dept: TRANSPLANT | Facility: CLINIC | Age: 58
End: 2018-11-12

## 2018-11-13 ENCOUNTER — DOCUMENTATION ONLY (OUTPATIENT)
Dept: TRANSPLANT | Facility: CLINIC | Age: 58
End: 2018-11-13

## 2018-11-13 ENCOUNTER — TELEPHONE (OUTPATIENT)
Dept: TRANSPLANT | Facility: CLINIC | Age: 58
End: 2018-11-13

## 2018-11-13 DIAGNOSIS — N17.9 AKI (ACUTE KIDNEY INJURY): ICD-10-CM

## 2018-11-13 DIAGNOSIS — Z94.0 KIDNEY REPLACED BY TRANSPLANT: Primary | ICD-10-CM

## 2018-11-13 LAB
CREATININE RANDOM URINE: 136 MG/DL
EXT ALBUMIN: 2.4
EXT ALKALINE PHOSPHATASE: 122
EXT ALT: 26
EXT AST: 24
EXT BILIRUBIN TOTAL: 0.3
EXT BUN: 23
EXT CALCIUM: 8.7
EXT CHLORIDE: 105
EXT CO2: 30
EXT CREATININE: 1.38 MG/DL
EXT EOSINOPHIL%: 2
EXT GLUCOSE: 144
EXT HEMATOCRIT: 44.1
EXT HEMOGLOBIN: 14.4
EXT LYMPH%: 20
EXT MAGNESIUM: 1.4
EXT MONOCYTES%: 10
EXT PLATELETS: 152
EXT POTASSIUM: 4.4
EXT PROTEIN TOTAL: 5
EXT SODIUM: 138 MMOL/L
EXT TACROLIMUS LVL: 5.5
EXT URIC ACID: 7.1
EXT WBC: 5.3
PROTEIN URINE RANDOM: 838

## 2018-11-13 NOTE — TELEPHONE ENCOUNTER
SW completed pt's reservation for two nights due to need for biopsy. Pt's reservation is from -. Pt's nightly rate is $0. SW informed pt and coordinator. SW remains available.       ----- Message from Vida Escalera RN sent at 2018  4:45 PM CST -----  [facility] NIGHT STAY REQUEST  Hey Isabel, so they are asking for a 2 night stay. His biopsy is on 18.    I copied their request below:    Well, that means we are going to have spend two nights, the  and the , because I can't see good at night to drive and if he has to have labs that early, there's no way we can get there in time from here.    Looks like we are going to have to drive back on Thanksgiving Day then.

## 2018-11-20 RX ORDER — MIDAZOLAM HYDROCHLORIDE 1 MG/ML
2 INJECTION INTRAMUSCULAR; INTRAVENOUS ONCE
Status: CANCELLED | OUTPATIENT
Start: 2018-11-20 | End: 2018-11-20

## 2018-11-20 RX ORDER — FENTANYL CITRATE 50 UG/ML
100 INJECTION, SOLUTION INTRAMUSCULAR; INTRAVENOUS ONCE
Status: CANCELLED | OUTPATIENT
Start: 2018-11-20 | End: 2018-11-20

## 2018-11-21 ENCOUNTER — PATIENT MESSAGE (OUTPATIENT)
Dept: TRANSPLANT | Facility: CLINIC | Age: 58
End: 2018-11-21

## 2018-11-21 ENCOUNTER — HOSPITAL ENCOUNTER (OUTPATIENT)
Facility: HOSPITAL | Age: 58
Discharge: HOME | End: 2018-11-21
Attending: INTERNAL MEDICINE | Admitting: INTERNAL MEDICINE
Payer: MEDICARE

## 2018-11-21 VITALS
OXYGEN SATURATION: 99 % | SYSTOLIC BLOOD PRESSURE: 145 MMHG | TEMPERATURE: 98 F | DIASTOLIC BLOOD PRESSURE: 69 MMHG | RESPIRATION RATE: 18 BRPM | HEART RATE: 58 BPM | HEIGHT: 72 IN | BODY MASS INDEX: 29.12 KG/M2 | WEIGHT: 215 LBS

## 2018-11-21 DIAGNOSIS — R79.89 ELEVATED SERUM CREATININE: ICD-10-CM

## 2018-11-21 LAB
INR PPP: 0.9
POCT GLUCOSE: 202 MG/DL (ref 70–110)
PROTHROMBIN TIME: 9.6 SEC

## 2018-11-21 PROCEDURE — 85610 PROTHROMBIN TIME: CPT

## 2018-11-21 PROCEDURE — 82962 GLUCOSE BLOOD TEST: CPT | Performed by: INTERNAL MEDICINE

## 2018-11-21 RX ORDER — LIDOCAINE HYDROCHLORIDE 10 MG/ML
1 INJECTION, SOLUTION EPIDURAL; INFILTRATION; INTRACAUDAL; PERINEURAL ONCE
Status: DISCONTINUED | OUTPATIENT
Start: 2018-11-21 | End: 2018-11-21 | Stop reason: HOSPADM

## 2018-11-21 NOTE — PROGRESS NOTES
Spoke to Aretha in ultrasound, she was made aware of pt ; no new orders at this time. Awaiting MD decision about patient taking blood thinner

## 2018-11-21 NOTE — PROGRESS NOTES
Ultrasound aware pt took blood thinner Effient this am; awaiting return call    Pt/INR sent off to lab

## 2018-11-21 NOTE — H&P
Radiology History & Physical      SUBJECTIVE:     Chief Complaint: Proteinuria in renal allograft.     History of Present Illness:  Anthony Burgses is a 58 y.o. male who presents for renal allograft biopsy.     Biopsy unable to be performed bc patient had not stopped Effient.       Past Medical History:   Diagnosis Date    Anemia of chronic renal failure     Anticoagulant long-term use     Antiphospholipid antibody syndrome     pulmonary embolism and arterial occlusions    Asthma     CKD (chronic kidney disease) 2013    CKD (chronic kidney disease), stage II 10/9/2013    Coronary artery disease     -donor kidney transplant for GN 13    Elevated glucose 2013    ESRD (end stage renal disease) 2005    secondary to glomerulonephritis    Hyperglycemia 2013    Hyperlipidemia     Hypertension 2014    Hypertension, renal     Hypophosphatemia     Hypothyroidism     Kidney stone     Myocardial infarction     Neutropenia, drug-induced 2013    Prophylactic immunotherapy for kidney transplant     PVD (peripheral vascular disease)     s/p right fem-pop    Secondary hyperparathyroidism of renal origin      Past Surgical History:   Procedure Laterality Date    BIOPSY N/A 2017    Performed by Rose Woods MD at [facility] [place] [address] [place] [address]    CARDIAC SURGERY      FEMORAL BYPASS      Right lower extremity    FRACTURE SURGERY      KIDNEY TRANSPLANT      2013    LEG SURGERY      TONSILLECTOMY      TRANSPLANT, KIDNEY N/A 2013    Performed by Pramod Perez MD at [facility] OR [address] [place] [address]    VASCULAR SURGERY         Home Meds:   Prior to Admission medications    Medication Sig Start Date End Date Taking? Authorizing Provider   ergocalciferol (ERGOCALCIFEROL) 50,000 unit Cap TAKE ONE CAPSULE BY MOUTH EVERY 7 DAYS. 18  Yes Sherrie Castillo MD   levothyroxine (SYNTHROID) 50 MCG tablet Take 50 mcg by mouth once daily.   Yes Historical  Provider, MD   linagliptin (TRADJENTA) 5 mg Tab tablet Take 5 mg by mouth once daily. 10/19/14 11/21/18 Yes Ines Mckeon MD   losartan (COZAAR) 50 MG tablet Take 50 mg by mouth once daily.   Yes Historical Provider, MD   magnesium oxide (MAG-OX) 400 mg tablet 800mg in AM PO and 400mg in PM PO.   Yes Historical Provider, MD   metoprolol tartrate (LOPRESSOR) 50 MG tablet Take 100 mg by mouth 2 (two) times daily.    Yes Historical Provider, MD   mycophenolate (CELLCEPT) 250 mg Cap TAKE (2) CAPSULES TWICE DAILY. 3/19/18  Yes Calvin Gerber MD   NIFEdipine (PROCARDIA-XL) 30 MG (OSM) 24 hr tablet Take 1 tablet (30 mg total) by mouth once daily. Hold if BP < 120/80 1/24/18 1/24/19 Yes Rachel Heck NP   pantoprazole (PROTONIX) 40 MG tablet Take 40 mg by mouth once daily.   Yes Historical Provider, MD   prasugrel (EFFIENT) 10 mg Tab Take 10 mg by mouth once daily.   Yes Historical Provider, MD   tacrolimus (PROGRAF) 1 MG Cap Take 1 mg by mouth in the am and 1 mg by mouth in the pm 12/30/17  Yes Rose Woods MD   atorvastatin (LIPITOR) 40 MG tablet Take 1 tablet (40 mg total) by mouth once daily. 1 Tablet Oral Every day 7/16/13   Tierra Toney NP   warfarin (COUMADIN) 5 MG tablet Take 7.5 mg by mouth on Monday, Wed, Thursday, Saturday and Sunday evenings. Take 10 mg by mouth on Tuesday and Friday evening.  Patient taking differently: Take 7.5 mg by mouth Daily.  5/2/17   Rose Woods MD   aspirin 81 MG Chew Take 81 mg by mouth once daily.  11/21/18  Historical Provider, MD     Anticoagulants/Antiplatelets: Effient. Last dose today in the morning.     Allergies:   Review of patient's allergies indicates:   Allergen Reactions    Plavix [clopidogrel] Other (See Comments)     Bilat leg soreness and discoloration .    Doxycycline hcl Nausea And Vomiting     Sedation History:  no adverse reactions    Review of Systems:   Hematological: no known coagulopathies  Respiratory: no shortness of  breath  Cardiovascular: no chest pain  Gastrointestinal: no abdominal pain  Genito-Urinary: no dysuria  Musculoskeletal: negative  Neurological: no TIA or stroke symptoms         OBJECTIVE:     Vital Signs (Most Recent)  Temp: 97.8 °F (36.6 °C) (11/21/18 1016)  Pulse: (!) 58 (11/21/18 1016)  Resp: 18 (11/21/18 1016)  BP: (!) 145/69 (11/21/18 1016)  SpO2: 99 % (11/21/18 1016)    Physical Exam:  ASA: 2  Mallampati: 2    General: no acute distress  Mental Status: alert and oriented to person, place and time  HEENT: normocephalic, atraumatic  Chest: unlabored breathing  Abdomen: nondistended  Extremity: moves all extremities    Laboratory  Lab Results   Component Value Date    INR 0.9 11/21/2018       Lab Results   Component Value Date    WBC 5.27 11/21/2018    HGB 14.5 11/21/2018    HCT 44.0 11/21/2018    MCV 89 11/21/2018     (L) 11/21/2018      Lab Results   Component Value Date     (H) 11/21/2018     (L) 11/21/2018    K 4.4 11/21/2018     11/21/2018    CO2 23 11/21/2018    BUN 23 (H) 11/21/2018    CREATININE 1.2 11/21/2018    CALCIUM 9.1 11/21/2018    MG 1.6 09/08/2017    ALT 69 (H) 07/21/2013    AST 43 (H) 07/21/2013    ALBUMIN 1.6 (L) 01/24/2018    BILITOT 0.4 07/21/2013    BILIDIR 0.2 07/21/2013       ASSESSMENT/PLAN:     Unable to perform procedure as patient was taking Effient this AM. Will need to return for procedure after having stopped all anticoagulants. This was communicated with Dr. Castillo, ordering physician. Patient understood why could not perform while on Effient.     Robert Riley MD  PGY - 3  Department of Radiology

## 2018-11-23 ENCOUNTER — PATIENT MESSAGE (OUTPATIENT)
Dept: TRANSPLANT | Facility: CLINIC | Age: 58
End: 2018-11-23

## 2018-11-27 ENCOUNTER — TELEPHONE (OUTPATIENT)
Dept: TRANSPLANT | Facility: CLINIC | Age: 58
End: 2018-11-27

## 2018-11-27 NOTE — TELEPHONE ENCOUNTER
Meessage sent to patient via [facility] days ago without response. Called patients phone lines, left message on home number since mobile number not accepting messages at this time. Message states request to reschedule biopsy at their convenience.

## 2018-12-17 RX ORDER — TACROLIMUS 1 MG/1
CAPSULE ORAL
Qty: 60 CAPSULE | Refills: 9 | Status: SHIPPED | OUTPATIENT
Start: 2018-12-17 | End: 2020-10-23

## 2019-02-04 DIAGNOSIS — Z94.0 DECEASED-DONOR KIDNEY TRANSPLANT: Chronic | ICD-10-CM

## 2019-02-04 DIAGNOSIS — Z29.89 PROPHYLACTIC IMMUNOTHERAPY: Chronic | ICD-10-CM

## 2019-02-04 DIAGNOSIS — I12.9 HYPERTENSION, RENAL: ICD-10-CM

## 2019-02-04 RX ORDER — NIFEDIPINE 30 MG/1
30 TABLET, EXTENDED RELEASE ORAL DAILY
Qty: 30 TABLET | Refills: 11 | Status: SHIPPED | OUTPATIENT
Start: 2019-02-04 | End: 2020-07-22

## 2019-02-20 ENCOUNTER — PATIENT MESSAGE (OUTPATIENT)
Dept: TRANSPLANT | Facility: CLINIC | Age: 59
End: 2019-02-20

## 2019-02-20 ENCOUNTER — DOCUMENTATION ONLY (OUTPATIENT)
Dept: TRANSPLANT | Facility: CLINIC | Age: 59
End: 2019-02-20

## 2019-02-20 LAB
CREATININE RANDOM URINE: 34 MG/DL (ref 20–320)
PROTEIN URINE RANDOM: 193 MG/DL (ref 2–25)
RATIO: 5676 MG/G (ref 22–128)

## 2019-02-23 RX ORDER — ERGOCALCIFEROL 1.25 MG/1
CAPSULE ORAL
Qty: 4 CAPSULE | Refills: 6 | Status: SHIPPED | OUTPATIENT
Start: 2019-02-23 | End: 2021-08-26

## 2019-03-19 RX ORDER — MYCOPHENOLATE MOFETIL 250 MG/1
CAPSULE ORAL
Qty: 120 CAPSULE | Refills: 11 | Status: SHIPPED | OUTPATIENT
Start: 2019-03-19 | End: 2020-03-18

## 2019-06-20 ENCOUNTER — DOCUMENTATION ONLY (OUTPATIENT)
Dept: TRANSPLANT | Facility: CLINIC | Age: 59
End: 2019-06-20

## 2019-06-20 LAB
CREATININE RANDOM URINE: 58 MG/DL (ref 40–120)
EPITHELIAL CELLS, UA: ABNORMAL
EXT ALBUMIN: 2.4 (ref 3.4–5)
EXT ALKALINE PHOSPHATASE: 171 (ref 46–116)
EXT ALT: 29 (ref 16–63)
EXT AST: 28 (ref 15–37)
EXT BACTERIA UA: ABNORMAL
EXT BILIRUBIN TOTAL: 0.3 (ref 0.2–1)
EXT BUN: 26 (ref 7–18)
EXT CALCIUM: 8.6 (ref 8.5–10.1)
EXT CHLORIDE: 104 (ref 98–107)
EXT CO2: 26 (ref 21–32)
EXT CREATININE: 1.4 MG/DL (ref 0.7–1.3)
EXT EOSINOPHIL%: 3 (ref 0–9)
EXT GLUCOSE UA: ABNORMAL
EXT GLUCOSE: 152 (ref 74–106)
EXT HEMATOCRIT: 38 (ref 39–53)
EXT HEMOGLOBIN: 12.9 (ref 12.5–17.5)
EXT LYMPH%: 24 (ref 20–60)
EXT MAGNESIUM: 1.5 (ref 1.8–2.4)
EXT MONOCYTES%: 9 (ref 1–16)
EXT NITRITES UA: NEGATIVE
EXT PHOSPHORUS: 3.1 (ref 2.6–4.7)
EXT PLATELETS: 126 (ref 140–400)
EXT POTASSIUM: 4.3 (ref 3.5–5.1)
EXT PROTEIN TOTAL: 4.9 (ref 6.4–8.2)
EXT PROTEIN UA: ABNORMAL
EXT RBC UA: ABNORMAL
EXT SEGS%: 64 (ref 40–80)
EXT SODIUM: 137 MMOL/L (ref 136–145)
EXT WBC UA: ABNORMAL
EXT WBC: 3.8 (ref 5–11.5)
PROTEIN URINE RANDOM: 224 MG/DL (ref 1–14)
RBC # FLD AUTO: POSITIVE 10*3/UL

## 2019-12-20 ENCOUNTER — DOCUMENTATION ONLY (OUTPATIENT)
Dept: TRANSPLANT | Facility: CLINIC | Age: 59
End: 2019-12-20

## 2019-12-20 ENCOUNTER — TELEPHONE (OUTPATIENT)
Dept: TRANSPLANT | Facility: CLINIC | Age: 59
End: 2019-12-20

## 2019-12-20 LAB — EXT GLUCOSE UA: 534

## 2019-12-20 NOTE — TELEPHONE ENCOUNTER
Patient returned our call we placed earlier today about a critical glucose level we were contacted today about of over 500. Patient states he checked his level just now at 455p and it was 341. He states he finished an antibiotic about 2 days ago for amoxicillin prescribed for a UTI. When asked about management of his glucose levels, he states he had an apt with his Endocrinologist several months ago but the office cancelled it due to physician needing to go out of town, when asked if he called the office back to reschedule he stated he has not followed up with that. Also asked patient if he has been monitoring his glucose levels and he states he has not. When asked if he is taking his prescribed medications for management of it, he states only his trajenta but often forgets to do his levemir.  Educated patient on importance of his glucose being well managed and how that will have a negative impact on the life of his kidney. Patient verbalized understanding and stated he would reach out to that physician for a follow up. Encouraged him to resume monitoring of his blood sugars for his safety.    Jud, requested we set him up for an overdue kidney transplant nephrologist follow up and he states that it is not a good time due to his wife still suffering from many ailments and there is no one else to bring him here. States it may be yet still a couple months before he can make it here.  Still waiting on more labs to result.

## 2019-12-20 NOTE — TELEPHONE ENCOUNTER
After receiving a call from [facility], messages left on both voicemail's, message states this morning glucose level is critical at 534 and that patient needs to report to his local ER for management of that unsafe level. Requested call back to [phone], more labs still pending.

## 2019-12-23 ENCOUNTER — DOCUMENTATION ONLY (OUTPATIENT)
Dept: TRANSPLANT | Facility: CLINIC | Age: 59
End: 2019-12-23

## 2019-12-23 LAB
CREATININE RANDOM URINE: 36 MG/DL
EXT ALBUMIN: 2.6 (ref 3.4–5)
EXT ALKALINE PHOSPHATASE: 154 (ref 46–116)
EXT ALT: 27 (ref 16–63)
EXT AST: 23 (ref 15–37)
EXT BACTERIA UA: ABNORMAL
EXT BILIRUBIN DIRECT: 0.1 MG/DL (ref 0–2)
EXT BILIRUBIN TOTAL: 0.6 (ref 0.2–1)
EXT BUN: 27 (ref 7–18)
EXT CALCIUM: 8.8 (ref 8.5–10.1)
EXT CHLORIDE: 96 (ref 98–107)
EXT CO2: 28 (ref 21–32)
EXT CREATININE: 1.74 MG/DL (ref 0.7–1.3)
EXT EOSINOPHIL%: 1 (ref 0–9)
EXT GFR MDRD NON AF AMER: 40.4
EXT GLUCOSE UA: ABNORMAL
EXT GLUCOSE: 534 (ref 74–106)
EXT HEMATOCRIT: 42.1 (ref 39–53)
EXT HEMOGLOBIN: 14.4 (ref 12.5–17.5)
EXT LYMPH%: 22 (ref 20–60)
EXT MONOCYTES%: 13 (ref 1–16)
EXT NITRITES UA: ABNORMAL
EXT PLATELETS: 120 (ref 140–400)
EXT POTASSIUM: 4.6 (ref 3.5–5.1)
EXT PROTEIN TOTAL: 5 (ref 6.4–8.2)
EXT PROTEIN UA: ABNORMAL
EXT PTH, INTACT: 135.4 (ref 18.4–135.4)
EXT RBC UA: ABNORMAL (ref 0–4)
EXT SEGS%: 63 (ref 40–80)
EXT SODIUM: 128 MMOL/L (ref 136–145)
EXT URINE CULTURE: NO GROWTH
EXT VIT D 25 HYDROXY: 20 (ref 25–80)
EXT WBC UA: ABNORMAL (ref 0–4)
EXT WBC: 4.7 (ref 5–11.5)
Lab: 6
PROTEIN URINE RANDOM: 393.6
RATIO: 10.9
RBC # FLD AUTO: POSITIVE 10*3/UL

## 2019-12-23 NOTE — PROGRESS NOTES
Glucose level addressed 12/20, labs all in and now posted reflect worsening Cr, worsening proteinuria. Patient LONG overdue for a repeat biospy, last time he was here biopsy cancelled (Nov 2018) due to having been taking a blood thinner and since that time patient refuses to reschedule stating his wife has been udnergoing a lot of ailments and cannot get back down here to do the biopsy even though we have suggested trying to have him find someone else to bring him here.  Tried to reschedule while on the phone 12/210 but still stating the exact same reasons as to why he cannot get down to [facility] for neither his overdue annual visit nor the biopsy.

## 2019-12-31 ENCOUNTER — TELEPHONE (OUTPATIENT)
Dept: TRANSPLANT | Facility: CLINIC | Age: 59
End: 2019-12-31

## 2019-12-31 NOTE — TELEPHONE ENCOUNTER
Reached out to patient several times this past year and states he cannot get down to [place] to have a long over due kidney biopsy nor his annual visit, these notations as well as labs sent to his local nephrologist office so that they are aware of his status from transplant stand point.

## 2020-01-25 DIAGNOSIS — I12.9 HYPERTENSION, RENAL: ICD-10-CM

## 2020-01-25 DIAGNOSIS — Z94.0 DECEASED-DONOR KIDNEY TRANSPLANT: Chronic | ICD-10-CM

## 2020-01-25 DIAGNOSIS — Z29.89 PROPHYLACTIC IMMUNOTHERAPY: Chronic | ICD-10-CM

## 2020-01-27 RX ORDER — NIFEDIPINE 30 MG/1
30 TABLET, EXTENDED RELEASE ORAL DAILY
Qty: 30 TABLET | Refills: 11 | OUTPATIENT
Start: 2020-01-27 | End: 2021-01-26

## 2020-02-04 ENCOUNTER — TELEPHONE (OUTPATIENT)
Dept: TRANSPLANT | Facility: CLINIC | Age: 60
End: 2020-02-04

## 2020-02-04 NOTE — TELEPHONE ENCOUNTER
Reached out to follow up with patient about overdue annual and long overdue kidney biopsy that is needed. Patient wife states that she cannot bring her  in until sometime in March now. She is waiting for her surgeon follow up apt on 2/18 to determine best time she can bring him in. States she will contact our office to schedule after that.

## 2020-03-03 ENCOUNTER — PATIENT MESSAGE (OUTPATIENT)
Dept: TRANSPLANT | Facility: CLINIC | Age: 60
End: 2020-03-03

## 2020-03-12 ENCOUNTER — DOCUMENTATION ONLY (OUTPATIENT)
Dept: TRANSPLANT | Facility: CLINIC | Age: 60
End: 2020-03-12

## 2020-03-12 ENCOUNTER — TELEPHONE (OUTPATIENT)
Dept: TRANSPLANT | Facility: CLINIC | Age: 60
End: 2020-03-12

## 2020-03-12 LAB
CREATININE RANDOM URINE: 132 MG/DL
EXT ALBUMIN: 2.8 (ref 3.5–5)
EXT ALKALINE PHOSPHATASE: 141 (ref 30–115)
EXT ALT: 20 (ref 0–4)
EXT AST: 24 (ref 15–37)
EXT BACTERIA UA: ABNORMAL
EXT BILIRUBIN DIRECT: 0.2 MG/DL (ref 0–0.4)
EXT BILIRUBIN TOTAL: 0.3 (ref 0.2–1)
EXT BUN: 31 (ref 5–25)
EXT CHLORIDE: 105 (ref 98–107)
EXT CO2: 26 (ref 21–32)
EXT CREATININE: 1.8 MG/DL (ref 0.7–1.5)
EXT EOSINOPHIL%: 1 (ref 0–9)
EXT GLUCOSE UA: 1
EXT HEMATOCRIT: 41.8 (ref 39–53)
EXT HEMOGLOBIN: 13.8 (ref 12.5–17.5)
EXT LYMPH%: 23 (ref 20–60)
EXT MAGNESIUM: 1.5 (ref 1.6–2.6)
EXT MONOCYTES%: 12 (ref 1–16)
EXT NITRITES UA: ABNORMAL
EXT PHOSPHORUS: 3.4 (ref 2.4–5.1)
EXT PLATELETS: 126 (ref 140–400)
EXT POTASSIUM: 5 (ref 3.5–5.1)
EXT PROTEIN TOTAL: 4.8 (ref 6.4–?)
EXT PROTEIN UA: ABNORMAL
EXT PTH, INTACT: 154.9 (ref 18.4–80.1)
EXT SEGS%: 64 (ref 40–80)
EXT SODIUM: 138 MMOL/L (ref 135–145)
EXT TACROLIMUS LVL: 9.7 (ref 2–20)
EXT VIT D 25 HYDROXY: 15.8 (ref 25–80)
EXT WBC UA: ABNORMAL
EXT WBC: 4.9 (ref 5–11.5)
PROTEIN URINE RANDOM: 113 MG/DL
RATIO: 0.85
RBC # FLD AUTO: POSITIVE 10*3/UL

## 2020-03-12 NOTE — TELEPHONE ENCOUNTER
Per Dr Castillo after review of improved UPC ratio, patient does not need a txp kidney biopsy. Patient also made aware that we will repeat labs and urine testing in July and also see him for his annual visit, araceli since he was not seen in 2019 due to his wife's numerous health issues and not being able to come to [facility].  Patient verbalized understanding

## 2020-03-18 RX ORDER — MYCOPHENOLATE MOFETIL 250 MG/1
CAPSULE ORAL
Qty: 120 CAPSULE | Refills: 11 | Status: SHIPPED | OUTPATIENT
Start: 2020-03-18 | End: 2020-03-23 | Stop reason: SDUPTHER

## 2020-03-18 NOTE — TELEPHONE ENCOUNTER
----- Message from Lito Perry RN sent at 5/9/2017 10:45 AM CDT -----      ----- Message -----     From: Rose Woods MD     Sent: 5/9/2017   9:07 AM       To: [facility] Post-Kidney Transplant Clinical    Results reviewed and the following message sent to patient via [facility]: This test confirms you are spilling a lot of protein. While we wait for other results, please check if you ever had a biopsy of your native kidneys with your doctors. It would be useful to have that information if it's there.   Secondary Intention Text (Leave Blank If You Do Not Want): We discussed various closure modalities with the patient, including healing by second intention, primary closure, skin graft, and various flaps.  After discussion of the risks and benefits of these various options, the decision was made to allow the wound to heal by second intention.

## 2020-03-23 DIAGNOSIS — Z94.0 KIDNEY REPLACED BY TRANSPLANT: Primary | ICD-10-CM

## 2020-03-23 RX ORDER — MYCOPHENOLATE MOFETIL 250 MG/1
CAPSULE ORAL
Qty: 120 CAPSULE | Refills: 11 | Status: SHIPPED | OUTPATIENT
Start: 2020-03-23 | End: 2020-03-24 | Stop reason: SDUPTHER

## 2020-03-24 DIAGNOSIS — Z94.0 KIDNEY REPLACED BY TRANSPLANT: ICD-10-CM

## 2020-03-24 RX ORDER — MYCOPHENOLATE MOFETIL 250 MG/1
CAPSULE ORAL
Qty: 120 CAPSULE | Refills: 11 | Status: SHIPPED | OUTPATIENT
Start: 2020-03-24 | End: 2020-03-25

## 2020-03-25 DIAGNOSIS — Z94.0 KIDNEY REPLACED BY TRANSPLANT: ICD-10-CM

## 2020-03-25 RX ORDER — MYCOPHENOLATE MOFETIL 250 MG/1
CAPSULE ORAL
Qty: 120 CAPSULE | Refills: 10 | Status: SHIPPED | OUTPATIENT
Start: 2020-03-25 | End: 2020-03-27

## 2020-03-27 RX ORDER — MYCOPHENOLATE MOFETIL 250 MG/1
500 CAPSULE ORAL 2 TIMES DAILY
Qty: 120 CAPSULE | Refills: 11 | Status: SHIPPED | OUTPATIENT
Start: 2020-03-27 | End: 2021-03-11

## 2020-03-27 NOTE — TELEPHONE ENCOUNTER
----- Message from Ondina Perry sent at 3/27/2020  1:05 PM CDT -----  Regarding: Cellcept  Hello,    Please send prescription for Cellcept to [facility] - [place] [place] - [address] as this is where the patient is filling the medication.    Thanks,  Ondina Perry

## 2020-07-14 ENCOUNTER — DOCUMENTATION ONLY (OUTPATIENT)
Dept: TRANSPLANT | Facility: CLINIC | Age: 60
End: 2020-07-14

## 2020-07-14 LAB
CREATININE RANDOM URINE: 157 MG/DL
EXT ALBUMIN: 3.1 (ref 3.5–5)
EXT ALKALINE PHOSPHATASE: 124 (ref 30–115)
EXT ALT: 15 (ref 0–40)
EXT AST: 20 (ref 15–37)
EXT BILIRUBIN DIRECT: 0.2 MG/DL (ref 0–0.4)
EXT BILIRUBIN TOTAL: 0.2 (ref 0.2–1)
EXT BUN: 36 (ref 5–25)
EXT CALCIUM: 9 (ref 8.5–10.5)
EXT CHLORIDE: 102 (ref 98–107)
EXT CO2: 26 (ref 21–32)
EXT CREATININE: 2.2 MG/DL (ref 0.7–1.5)
EXT EOSINOPHIL%: 1 (ref 0–9)
EXT GLUCOSE: 246 (ref 70–99)
EXT HEMATOCRIT: 42.3 (ref 39–53)
EXT HEMOGLOBIN: 13.9 (ref 12.5–17.5)
EXT LYMPH%: 21 (ref 20–60)
EXT MAGNESIUM: 1.9 (ref 1.6–2.6)
EXT MONOCYTES%: 12 (ref 1–16)
EXT PHOSPHORUS: 2.9 (ref 2.4–5.1)
EXT PLATELETS: 134 (ref 140–400)
EXT POTASSIUM: 5 (ref 3.5–5.1)
EXT PROTEIN TOTAL: 5.1 (ref 6.4–8.2)
EXT PTH, INTACT: 151.3 (ref 18.4–80.1)
EXT SEGS%: 65 (ref 40–80)
EXT SODIUM: 133 MMOL/L (ref 135–145)
EXT TACROLIMUS LVL: 7.4
EXT VIT D 25 HYDROXY: 10.7 (ref 25–80)
EXT WBC: 3.8 (ref 5–11.5)
PROTEIN URINE RANDOM: 819 MG/DL (ref 0–11)
RATIO: 5.2

## 2020-07-21 NOTE — PROGRESS NOTES
.  Kidney Post-Transplant Assessment    Referring Physician: Faraz Yao  Current Nephrologist: Shai Moore    ORGAN: LEFT KIDNEY  Donor Type:  - brain death   PHS Increased Risk: no  Cold Ischemia: 875 mins  Induction Medications: campath - alemtuzumab (anti-cd52)  CMV Status:    Subjective:     CC:  Reassessment of renal allograft function and management of chronic immunosuppression.    Kidney History:    Kidney History: Mr. Burgess is a 60 y.o. year old  or Other  male who received a  - brain death kidney transplant on 13 for ? MPGN/? MN (induction: Campath, CMV status: D+, R-,  bridged with heparin postoperatively for his history of antiphospholipid antibody syndrome) . Immunosuppression with Prograf and Cellcept. His baseline creatinine has been 1.5-2.0 in last few months. While it was 1.1-1.5 last year.       Patient developed proteinuria of >4 gram for which patient underwent kidney allograft biopsy while he was bridged with heparin for his history of antiphospholipid syndrome at [facility] in 2017. 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 Last ritux was in 2018. Patient has developed AVF infection and DVTs in last year and was not able to come and visit us at [facility].  Last UPCR 8 gram.   He has also had some worsening of kidney function in last few months. he denies any chest pain, shortness of breath, ENT symptoms, nausea/vomiting, dysuria, frequency, urgency, or pain over the allograft. He accepts that he does not drink enough and sweats a lot. He states that he is supposed to have some cardiac work up with his cardiologist next month and can not come to [facility] for kidney biopsy till October.         Medications:  Current Outpatient Medications   Medication Sig Dispense Refill    atorvastatin (LIPITOR) 40 MG tablet  Take 1 tablet (40 mg total) by mouth once daily. 1 Tablet Oral Every day 30 tablet 5    ergocalciferol (ERGOCALCIFEROL) 50,000 unit Cap TAKE ONE CAPSULE BY MOUTH EVERY 7 DAYS. 4 capsule 6    levothyroxine (SYNTHROID) 50 MCG tablet Take 50 mcg by mouth once daily.      linagliptin (TRADJENTA) 5 mg Tab tablet Take 5 mg by mouth once daily.      losartan (COZAAR) 50 MG tablet Take 50 mg by mouth once daily.      magnesium oxide (MAG-OX) 400 mg tablet 800mg in AM PO and 400mg in PM PO.      metoprolol tartrate (LOPRESSOR) 50 MG tablet Take 100 mg by mouth 2 (two) times daily.       mycophenolate (CELLCEPT) 250 mg Cap Take 2 capsules (500 mg total) by mouth 2 (two) times daily. Z94.0 7/13/2013 120 capsule 11    NIFEdipine (PROCARDIA-XL) 30 MG (OSM) 24 hr tablet TAKE 1 TABLET (30 MG TOTAL) BY MOUTH ONCE DAILY. HOLD IF BP < 120/80 30 tablet 11    pantoprazole (PROTONIX) 40 MG tablet Take 40 mg by mouth once daily.      prasugrel (EFFIENT) 10 mg Tab Take 10 mg by mouth once daily.      tacrolimus (PROGRAF) 1 MG Cap TAKE 1 CAPSULE EVERY MORNING AND 1 CAPSULE EVERY EVENING. 60 capsule 9    warfarin (COUMADIN) 5 MG tablet Take 7.5 mg by mouth on Monday, Wed, Thursday, Saturday and Sunday evenings. Take 10 mg by mouth on Tuesday and Friday evening. (Patient taking differently: Take 7.5 mg by mouth Daily. ) 24 tablet 1     No current facility-administered medications for this visit.            Review of Systems     Constitutional: Negative for activity change, appetite change, chills, fatigue, fever and unexpected weight change.   Respiratory: Negative for cough, chest tightness, shortness of breath and wheezing.    Cardiovascular: Negative.  Negative for chest pain, palpitations and leg swelling.   Gastrointestinal: Negative for abdominal pain, diarrhea, nausea and vomiting.   Genitourinary: Negative for dysuria, flank pain and urgency.   Musculoskeletal: Negative for gait problem, neck pain and neck stiffness.    Skin: Negative for rash.   Allergic/Immunologic: Positive for immunocompromised state. Negative for environmental allergies and food allergies.   Neurological: Negative for dizziness, weakness, light-headedness and headaches.   Psychiatric/Behavioral: Negative for agitation and confusion. The patient is not nervous/anxious.                  Labs:  Lab Results   Component Value Date    WBC 5.27 11/21/2018    HGB 14.5 11/21/2018    HCT 44.0 11/21/2018     (L) 11/21/2018    K 4.4 11/21/2018     11/21/2018    CO2 23 11/21/2018    BUN 23 (H) 11/21/2018    CREATININE 1.2 11/21/2018    EGFRNONAA >60.0 11/21/2018    CALCIUM 9.1 11/21/2018    PHOS 3.2 01/24/2018    MG 1.6 09/08/2017    ALBUMIN 1.6 (L) 01/24/2018    AST 43 (H) 07/21/2013    ALT 69 (H) 07/21/2013       Lab Results   Component Value Date    EXTANC  07/08/2015      Comment:      clinic 7/15/15    EXTWBC 3.8 (A) 07/13/2020    EXTSEGS 65 07/13/2020    EXTPLATELETS 134 (A) 07/13/2020    EXTHEMOGLOBI 13.9 07/13/2020    EXTHEMATOCRI 42.3 07/13/2020    EXTCREATININ 2.20 (A) 07/13/2020    EXTSODIUM 133 (A) 07/13/2020    EXTPOTASSIUM 5.0 07/13/2020    EXTBUN 36 (A) 07/13/2020    EXTCO2 26 07/13/2020    EXTCALCIUM 9.0 07/13/2020    EXTPHOSPHORU 2.9 07/13/2020    EXTGLUCOSE 246 (A) 07/13/2020    EXTALBUMIN 3.1 (A) 07/13/2020    EXTAST 20 07/13/2020    EXTALT 15 07/13/2020    EXTBILITOTAL 0.20 07/13/2020       Lab Results   Component Value Date    EXTSIROLIMUS 5.5 07/14/2016    EXTTACROLVL 7.4 07/13/2020    EXTPROTCRE 0.56 07/24/2018    EXTPTHINTACT 151.3 (A) 07/13/2020    EXTPROTEINUA 4+ 03/09/2020    EXTWBCUA few 03/09/2020    EXTRBCUA 4-10 12/20/2019       Labs were reviewed with the patient.    Assessment/Plan:     No diagnosis found.    Mr. Burgess is a 60 y.o. male with:     # History of ESRD presumed secondary to ? MPGN/ ? MN   - s/p DDKT in 2013  - recurrent MPGN   - Recent baseline Cr 1.5-2.0  - last Cr 2.2  - Pending UPC ratio ~8 gram  - Continue  ARB       # Proteinuria  - Kidney biopsy : MN and focal acute tubular injury. Requested native kidney biopsy from nephrology office, they don't have it. It is not clear whether this MN is de sara or recurrent.    - Serum PLA2R positive last year  - Pt has had 6 doses of rituximab.   - Patient was talked regarding kidney biopsy. He is not able to come [place] [facility] [place] kidney biopsy     # Immunosuppression:   - continue Prograf 2/1 mg   - continue  mg BID   - will monitor closely for toxicities       # HTN:   - BPs well controlled   - continue with home blood pressure monitoring   - low salt and healthy life discussed with the patient     # Metabolic Bone Disease/Secondary Hyperparathyroidism:  - on ergo 50383 units weekly       # History of  DVTs  - on warfarin      Follow-up:   - in 4 months       Plan:  Follow up with local general nephrology  Kidney US   PLA2R with next lab   Possible kidney biopsy. patient can have it in October not earlier.

## 2020-07-22 ENCOUNTER — OFFICE VISIT (OUTPATIENT)
Dept: TRANSPLANT | Facility: CLINIC | Age: 60
End: 2020-07-22
Payer: MEDICARE

## 2020-07-22 DIAGNOSIS — I12.9 HYPERTENSION, RENAL: ICD-10-CM

## 2020-07-22 DIAGNOSIS — Z29.89 PROPHYLACTIC IMMUNOTHERAPY: Chronic | ICD-10-CM

## 2020-07-22 DIAGNOSIS — Z94.0 DECEASED-DONOR KIDNEY TRANSPLANT: Chronic | ICD-10-CM

## 2020-07-22 PROCEDURE — 99214 OFFICE O/P EST MOD 30 MIN: CPT | Mod: 95,,, | Performed by: INTERNAL MEDICINE

## 2020-07-22 PROCEDURE — 99214 PR OFFICE/OUTPT VISIT, EST, LEVL IV, 30-39 MIN: ICD-10-PCS | Mod: 95,,, | Performed by: INTERNAL MEDICINE

## 2020-07-22 RX ORDER — NIFEDIPINE 30 MG/1
30 TABLET, EXTENDED RELEASE ORAL DAILY
Qty: 30 TABLET | Refills: 11 | Status: SHIPPED | OUTPATIENT
Start: 2020-07-22 | End: 2023-04-26

## 2020-07-22 NOTE — LETTER
July 22, 2020        Shai Moore  [address] [place] [address]  [place] [address]  Phone: [phone]  Fax: [phone]             [facility]  [address]  [facility] [address]  Phone: [phone]   Patient: Anthony Burgess   MR Number: [id number]   YOB: 1960   Date of Visit: 7/22/2020       Dear Dr. Shai Moore    Thank you for referring Anthony Burgess to me for evaluation. Attached you will find relevant portions of my assessment and plan of care.    If you have questions, please do not hesitate to call me. I look forward to following Anthony Burgess along with you.    Sincerely,    Sherrie Castillo MD    Enclosure    If you would like to receive this communication electronically, please contact [email or web address] or [phone] to request [email or web address] Link access.    [email or web address] Link is a tool which provides read-only access to select patient information with whom you have a relationship. Its easy to use and provides real time access to review your patients record including encounter summaries, notes, results, and demographic information.    If you feel you have received this communication in error or would no longer like to receive these types of communications, please e-mail [email or web address]

## 2020-07-22 NOTE — Clinical Note
Plan:  Follow up with local general nephrology  Kidney US   PLA2R with next lab   Possible kidney biopsy. patient can have it in October not earlier.

## 2020-07-31 RX ORDER — HYDROCHLOROTHIAZIDE 25 MG/1
25 TABLET ORAL DAILY
Qty: 30 TABLET | Refills: 0
Start: 2020-07-31 | End: 2023-04-26 | Stop reason: ALTCHOICE

## 2020-07-31 RX ORDER — HYDRALAZINE HYDROCHLORIDE 25 MG/1
50 TABLET, FILM COATED ORAL EVERY 12 HOURS
Qty: 90 TABLET | Refills: 0
Start: 2020-07-31 | End: 2023-04-26

## 2020-07-31 NOTE — TELEPHONE ENCOUNTER
Received call from patient to update his BPmed list since his cardiologist was adjusted BP meds. Changes made, per Dr Castillo and ok with Cardiologist, no change to the Losartan.    Patient made aware that I am mailing him a lab order for a specialty test requested by Dr Castillo and to let me know if he doesn't receive in the next week to two.

## 2020-08-25 ENCOUNTER — DOCUMENTATION ONLY (OUTPATIENT)
Dept: TRANSPLANT | Facility: CLINIC | Age: 60
End: 2020-08-25

## 2020-08-25 LAB — ANTI-PLA2R: <1.8 (ref 0–19.9)

## 2020-11-10 ENCOUNTER — TELEPHONE (OUTPATIENT)
Dept: TRANSPLANT | Facility: CLINIC | Age: 60
End: 2020-11-10

## 2020-11-10 DIAGNOSIS — Z94.0 KIDNEY REPLACED BY TRANSPLANT: Primary | ICD-10-CM

## 2020-11-10 DIAGNOSIS — R80.9 PROTEINURIA, UNSPECIFIED TYPE: ICD-10-CM

## 2020-11-10 NOTE — TELEPHONE ENCOUNTER
Writer contacted patient to discuss scheduling Kidney Biopsy, in speaking with patients wife, she states patient memory is not doing well and she takes care of his appointments. Mrs Paz states that her health is failing and driving to [place] is just to difficult to do and therefore she cannot reschedule bring him for the biopsy.    She is however able to bring him to do a Kidney US at their local hospital on 11/17 (writer scheduled with [facility] location) with her confirmation of date.    External lab order faxed to [facility].

## 2020-12-06 NOTE — TELEPHONE ENCOUNTER
Discussed patient's plan for next Rituxan.  Plan to do Rituxan treatment in May, if no infectious process.  Will need labs 1 week prior to Rituxan.           change in vison, tunnel vision, then subsided and felt nausea and weak, , then happened 2 more episodes, while it happens she feels very anxious,  oksana cortisone shot on wed in low back, first time wed, then saturday then today.

## 2021-01-08 ENCOUNTER — PATIENT MESSAGE (OUTPATIENT)
Dept: TRANSPLANT | Facility: CLINIC | Age: 61
End: 2021-01-08

## 2021-08-23 ENCOUNTER — PATIENT MESSAGE (OUTPATIENT)
Dept: TRANSPLANT | Facility: CLINIC | Age: 61
End: 2021-08-23

## 2021-08-25 ENCOUNTER — DOCUMENTATION ONLY (OUTPATIENT)
Dept: TRANSPLANT | Facility: CLINIC | Age: 61
End: 2021-08-25

## 2021-08-25 LAB
EXT ALBUMIN: ABNORMAL
EXT ALKALINE PHOSPHATASE: ABNORMAL
EXT ALLOSURE: ABNORMAL
EXT ALT: ABNORMAL
EXT AMYLASE: ABNORMAL
EXT ANC: ABNORMAL
EXT AST: ABNORMAL
EXT BACTERIA UA: ABNORMAL
EXT BANDS%: ABNORMAL
EXT BILIRUBIN DIRECT: ABNORMAL
EXT BILIRUBIN TOTAL: ABNORMAL
EXT BK VIRUS DNA QN PCR: ABNORMAL
EXT BK VIRUS DNA QUANT, PCR, URINE: ABNORMAL
EXT BUN: 35 (ref 5–25)
EXT C PEPTIDE: ABNORMAL
EXT CALCIUM: 8.8 (ref 8.5–10.5)
EXT CHLORIDE: 103 (ref 98–107)
EXT CHOLESTEROL (LIPID PANEL): ABNORMAL
EXT CHOLESTEROL: ABNORMAL
EXT CMV DNA QUANT. BY PCR: ABNORMAL
EXT CO2: 24 (ref 21–32)
EXT CREATININE: 2.4 MG/DL (ref 0.6–1.1)
EXT CYCLOSPORINE LVL: ABNORMAL
EXT EBV DNA BY PCR: ABNORMAL
EXT EBV DNA-COPIES/ML: ABNORMAL
EXT EOSINOPHIL%: 2
EXT FERRITIN: ABNORMAL
EXT GFR MDRD AF AMER: ABNORMAL
EXT GFR MDRD NON AF AMER: ABNORMAL
EXT GLUCOSE UA: ABNORMAL
EXT GLUCOSE: 155 (ref 70–99)
EXT HBV DNA QUANT PCR: ABNORMAL
EXT HCV QUANT: ABNORMAL
EXT HDL: ABNORMAL
EXT HEMATOCRIT: 40 (ref 39–53)
EXT HEMOGLOBIN A1C: ABNORMAL
EXT HEMOGLOBIN: 13.2 (ref 12.5–17.5)
EXT HEP B S AG: ABNORMAL
EXT HIV RNA QUANT PCR: ABNORMAL
EXT HIV: ABNORMAL
EXT IMMUNKNOW (STIMULATED): ABNORMAL
EXT INR: ABNORMAL
EXT IRON SATURATION: ABNORMAL
EXT LDH, TOTAL: ABNORMAL
EXT LDL CHOLESTEROL: ABNORMAL
EXT LIPASE: ABNORMAL
EXT LYMPH%: 19
EXT MAGNESIUM: 2 (ref 1.6–2.6)
EXT MONOCYTES%: 12
EXT NITRITES UA: ABNORMAL
EXT PHOSPHORUS: 3.9 (ref 2.4–5.1)
EXT PLATELETS: 122 (ref 140–400)
EXT POTASSIUM: 4.7 (ref 3.5–5.1)
EXT PROT/CREAT RATIO UR: ABNORMAL
EXT PROTEIN TOTAL: ABNORMAL
EXT PROTEIN UA: ABNORMAL
EXT PT: ABNORMAL
EXT PTH, INTACT: 197.3 (ref 18.4–80.1)
EXT RBC UA: ABNORMAL
EXT SARS COV-2 (COVID-19): ABNORMAL
EXT SEGS%: 66
EXT SERUM IRON: ABNORMAL
EXT SIROLIMUS LVL: ABNORMAL
EXT SODIUM: 135 MMOL/L (ref 135–145)
EXT STOOL CDIFF: ABNORMAL
EXT STOOL CMV: ABNORMAL
EXT STOOL CULTURE: ABNORMAL
EXT STOOL OCP: ABNORMAL
EXT TACROLIMUS LVL: ABNORMAL
EXT TIBC: ABNORMAL
EXT TRIGLYCERIDES: ABNORMAL
EXT UNSATURATED IRON BINDING CAP.: ABNORMAL
EXT URIC ACID: ABNORMAL
EXT URINE CULTURE: ABNORMAL
EXT VIT D 25 HYDROXY: 28.3 (ref 25–80)
EXT WBC UA: ABNORMAL
EXT WBC: 5.2 (ref 5–11.5)

## 2021-08-26 DIAGNOSIS — Z94.0 KIDNEY REPLACED BY TRANSPLANT: Primary | ICD-10-CM

## 2021-08-27 ENCOUNTER — PATIENT MESSAGE (OUTPATIENT)
Dept: TRANSPLANT | Facility: CLINIC | Age: 61
End: 2021-08-27

## 2021-08-27 RX ORDER — ERGOCALCIFEROL 1.25 MG/1
50000 CAPSULE ORAL
Qty: 1 CAPSULE | Refills: 11 | Status: SHIPPED | OUTPATIENT
Start: 2021-08-27 | End: 2022-07-14

## 2021-08-27 RX ORDER — CALCITRIOL 0.25 UG/1
0.25 CAPSULE ORAL DAILY
Qty: 30 CAPSULE | Refills: 11 | Status: SHIPPED | OUTPATIENT
Start: 2021-08-27 | End: 2022-07-14

## 2021-09-15 ENCOUNTER — DOCUMENTATION ONLY (OUTPATIENT)
Dept: TRANSPLANT | Facility: CLINIC | Age: 61
End: 2021-09-15

## 2021-09-15 DIAGNOSIS — Z94.0 KIDNEY REPLACED BY TRANSPLANT: Primary | ICD-10-CM

## 2021-09-15 LAB — EXT TACROLIMUS LVL: 8.6

## 2021-09-16 ENCOUNTER — PATIENT MESSAGE (OUTPATIENT)
Dept: TRANSPLANT | Facility: CLINIC | Age: 61
End: 2021-09-16

## 2021-09-16 RX ORDER — TACROLIMUS 1 MG/1
CAPSULE ORAL
Qty: 270 CAPSULE | Refills: 11 | Status: SHIPPED | OUTPATIENT
Start: 2021-09-16 | End: 2022-09-08

## 2021-09-17 ENCOUNTER — PATIENT MESSAGE (OUTPATIENT)
Dept: TRANSPLANT | Facility: CLINIC | Age: 61
End: 2021-09-17

## 2021-10-05 ENCOUNTER — DOCUMENTATION ONLY (OUTPATIENT)
Dept: TRANSPLANT | Facility: CLINIC | Age: 61
End: 2021-10-05

## 2021-10-05 LAB — EXT TACROLIMUS LVL: 5.2

## 2021-10-08 ENCOUNTER — PATIENT MESSAGE (OUTPATIENT)
Dept: TRANSPLANT | Facility: CLINIC | Age: 61
End: 2021-10-08

## 2021-10-08 ENCOUNTER — OFFICE VISIT (OUTPATIENT)
Dept: TRANSPLANT | Facility: CLINIC | Age: 61
End: 2021-10-08
Payer: MEDICARE

## 2021-10-08 DIAGNOSIS — Z94.0 DECEASED-DONOR KIDNEY TRANSPLANT: Chronic | ICD-10-CM

## 2021-10-08 DIAGNOSIS — Z29.89 PROPHYLACTIC IMMUNOTHERAPY: Chronic | ICD-10-CM

## 2021-10-08 DIAGNOSIS — I10 PRIMARY HYPERTENSION: Primary | Chronic | ICD-10-CM

## 2021-10-08 DIAGNOSIS — D68.61 ANTIPHOSPHOLIPID ANTIBODY SYNDROME: ICD-10-CM

## 2021-10-08 PROCEDURE — 99214 PR OFFICE/OUTPT VISIT, EST, LEVL IV, 30-39 MIN: ICD-10-PCS | Mod: 95,,, | Performed by: INTERNAL MEDICINE

## 2021-10-08 PROCEDURE — 99214 OFFICE O/P EST MOD 30 MIN: CPT | Mod: 95,,, | Performed by: INTERNAL MEDICINE

## 2022-05-11 ENCOUNTER — PATIENT MESSAGE (OUTPATIENT)
Dept: RESEARCH | Facility: CLINIC | Age: 62
End: 2022-05-11
Payer: MEDICARE

## 2022-05-17 ENCOUNTER — DOCUMENTATION ONLY (OUTPATIENT)
Dept: TRANSPLANT | Facility: CLINIC | Age: 62
End: 2022-05-17
Payer: MEDICARE

## 2023-03-03 ENCOUNTER — TELEPHONE (OUTPATIENT)
Dept: TRANSPLANT | Facility: CLINIC | Age: 63
End: 2023-03-03
Payer: MEDICARE

## 2023-03-03 NOTE — LETTER
March 3, 2023    Shai Moore  [address] [place] [address]  [place] [address]  Phone: [phone]  Fax: [phone]    Dear Dr. Shai Moore:    Patient: Anthony Burgess  MR Number [id number]  Date of Birth 1960    Thank you for your referral of Anthony Burgess to our transplant program.      Your patient's information will be screened by our Referral Nurse Coordinators to determine initial medical candidacy and if any further records are required. We will contact you if further information is needed to process the referral.     Once all needed information is received it will be forwarded to our Transplant Financial Coordinators who will obtain insurance authorization. Upon insurance approval, your patient will be contacted by our  to schedule their appointments with the transplant team. When appointments are made you will receive a copy of the appointment letter that your patient will receive.     This process may take two to six weeks to complete.     In the event your patient is not a candidate a copy of our transplant programs opinion including reasons will be returned to you to comply with your yearly review regulations. A copy of that letter will also be sent to the patient.     The following information is needed to process a referral:  Medicare form 2728 for all patients on dialysis.  Dental clearance is required if your patient has primary Medicaid.  Current immunization record.  This information can be faxed to [phone].  Current Dietician evaluation can be faxed to [phone].    Thank you again for your trust in our program and the care of your patient.  If there is anything we can do for you or your staff, please feel free to contact us at [phone].    Sincerely,   [facility]  Kidney & Kidney/Pancreas Transplant Team  [facility] [place] [address]  [phone]

## 2023-03-03 NOTE — TELEPHONE ENCOUNTER
Contacted patient to review initial intake information. Patient reports the followin. Can you walk up a flight of stairs without getting short of breath or stopping? Yes   2. Can you walk one block without getting short of breath or having to stop? No   3. Do you use oxygen? No   4. Do you use a cane, walker, or wheel chair to assist in mobility? No   5. Have you been hospitalized or had recent surgery in the last 6 months? No    A. Stroke   B. Heart surgery or heart catheterization   C. Broken bone  6. Do you have any cuts, open sores (ulcers), or wounds anywhere on your body? No   7. Do you go to dialysis? Yes What days? M,T,T,F,S  8. Preferred appointment day?   9. Caregiver? Wife (Brandi)    Patient is aware the next steps will include completing records and compliance verification. Patient is aware once provider review and insurance authorization is received we will contact patient to schedule initial visit. Patient is aware that initial visit will begin prior to / at 7 am and will conclude at approximately 3 pm on date of appointment. All questions answered at this time.

## 2023-03-09 ENCOUNTER — TELEPHONE (OUTPATIENT)
Dept: TRANSPLANT | Facility: CLINIC | Age: 63
End: 2023-03-09
Payer: MEDICARE

## 2023-03-10 DIAGNOSIS — Z76.82 ORGAN TRANSPLANT CANDIDATE: Primary | ICD-10-CM

## 2023-04-10 ENCOUNTER — TELEPHONE (OUTPATIENT)
Dept: TRANSPLANT | Facility: CLINIC | Age: 63
End: 2023-04-10
Payer: MEDICARE

## 2023-04-11 ENCOUNTER — TELEPHONE (OUTPATIENT)
Dept: TRANSPLANT | Facility: CLINIC | Age: 63
End: 2023-04-11
Payer: MEDICARE

## 2023-04-11 NOTE — TELEPHONE ENCOUNTER
MA notes per adherence form     FOR THE PAST THREE MONTHS:    0-AMA's  0-No-shows    No concerns with care giving, transportation, or mental health    Brought over to clinic to be scanned in.    Suad Murphy  Abdominal Transplant MA

## 2023-04-25 ENCOUNTER — TELEPHONE (OUTPATIENT)
Dept: TRANSPLANT | Facility: CLINIC | Age: 63
End: 2023-04-25
Payer: MEDICARE

## 2023-04-25 ENCOUNTER — NURSE TRIAGE (OUTPATIENT)
Dept: ADMINISTRATIVE | Facility: CLINIC | Age: 63
End: 2023-04-25
Payer: MEDICARE

## 2023-04-26 ENCOUNTER — HOSPITAL ENCOUNTER (OUTPATIENT)
Dept: RADIOLOGY | Facility: HOSPITAL | Age: 63
Discharge: HOME | End: 2023-04-26
Payer: COMMERCIAL

## 2023-04-26 ENCOUNTER — OFFICE VISIT (OUTPATIENT)
Dept: TRANSPLANT | Facility: CLINIC | Age: 63
End: 2023-04-26
Payer: MEDICARE

## 2023-04-26 ENCOUNTER — HOSPITAL ENCOUNTER (OUTPATIENT)
Dept: RADIOLOGY | Facility: HOSPITAL | Age: 63
Discharge: HOME | End: 2023-04-26
Payer: MEDICARE

## 2023-04-26 VITALS
HEART RATE: 60 BPM | HEIGHT: 72 IN | OXYGEN SATURATION: 98 % | BODY MASS INDEX: 27.68 KG/M2 | SYSTOLIC BLOOD PRESSURE: 204 MMHG | DIASTOLIC BLOOD PRESSURE: 93 MMHG | TEMPERATURE: 97 F | WEIGHT: 204.38 LBS | RESPIRATION RATE: 18 BRPM

## 2023-04-26 DIAGNOSIS — N02.2 MEMBRANOUS NEPHROPATHY DETERMINED BY BIOPSY: ICD-10-CM

## 2023-04-26 DIAGNOSIS — I10 PRIMARY HYPERTENSION: Chronic | ICD-10-CM

## 2023-04-26 DIAGNOSIS — D68.61 ANTIPHOSPHOLIPID ANTIBODY SYNDROME: ICD-10-CM

## 2023-04-26 DIAGNOSIS — Z76.82 ORGAN TRANSPLANT CANDIDATE: ICD-10-CM

## 2023-04-26 DIAGNOSIS — N18.6 ESRD ON HEMODIALYSIS: ICD-10-CM

## 2023-04-26 DIAGNOSIS — Z99.2 ESRD ON HEMODIALYSIS: ICD-10-CM

## 2023-04-26 DIAGNOSIS — Z01.818 PRE-TRANSPLANT EVALUATION FOR KIDNEY TRANSPLANT: Primary | ICD-10-CM

## 2023-04-26 DIAGNOSIS — Z71.85 IMMUNIZATION COUNSELING: ICD-10-CM

## 2023-04-26 DIAGNOSIS — I25.10 CORONARY ARTERY DISEASE INVOLVING NATIVE CORONARY ARTERY OF NATIVE HEART WITHOUT ANGINA PECTORIS: ICD-10-CM

## 2023-04-26 DIAGNOSIS — Z94.0 DECEASED-DONOR KIDNEY TRANSPLANT: Chronic | ICD-10-CM

## 2023-04-26 LAB
CREAT UR-MCNC: 64 MG/DL (ref 23–375)
PROT UR-MCNC: >2000 MG/DL (ref 0–15)
PROT/CREAT UR: ABNORMAL MG/G{CREAT} (ref 0–0.2)

## 2023-04-26 PROCEDURE — 99205 PR OFFICE/OUTPT VISIT, NEW, LEVL V, 60-74 MIN: ICD-10-PCS | Mod: S$PBB,TXP,, | Performed by: NURSE PRACTITIONER

## 2023-04-26 PROCEDURE — 99245 PR OFFICE CONSULTATION,LEVEL V: ICD-10-PCS | Mod: S$PBB,TXP,, | Performed by: TRANSPLANT SURGERY

## 2023-04-26 PROCEDURE — 72170 X-RAY EXAM OF PELVIS: CPT | Mod: 26,TXP,, | Performed by: RADIOLOGY

## 2023-04-26 PROCEDURE — 99203 OFFICE O/P NEW LOW 30 MIN: CPT | Mod: S$PBB,TXP,, | Performed by: STUDENT IN AN ORGANIZED HEALTH CARE EDUCATION/TRAINING PROGRAM

## 2023-04-26 PROCEDURE — 99203 PR OFFICE/OUTPT VISIT, NEW, LEVL III, 30-44 MIN: ICD-10-PCS | Mod: S$PBB,TXP,, | Performed by: STUDENT IN AN ORGANIZED HEALTH CARE EDUCATION/TRAINING PROGRAM

## 2023-04-26 PROCEDURE — 72170 XR PELVIS ROUTINE AP: ICD-10-PCS | Mod: 26,TXP,, | Performed by: RADIOLOGY

## 2023-04-26 PROCEDURE — 76776 US EXAM K TRANSPL W/DOPPLER: CPT | Mod: 26,TXP,, | Performed by: STUDENT IN AN ORGANIZED HEALTH CARE EDUCATION/TRAINING PROGRAM

## 2023-04-26 PROCEDURE — 76770 US EXAM ABDO BACK WALL COMP: CPT | Mod: 26,TXP,, | Performed by: STUDENT IN AN ORGANIZED HEALTH CARE EDUCATION/TRAINING PROGRAM

## 2023-04-26 PROCEDURE — 76770 US RETROPERITONEAL COMPLETE: ICD-10-PCS | Mod: 26,TXP,, | Performed by: STUDENT IN AN ORGANIZED HEALTH CARE EDUCATION/TRAINING PROGRAM

## 2023-04-26 PROCEDURE — 71046 X-RAY EXAM CHEST 2 VIEWS: CPT | Mod: 26,TXP,, | Performed by: RADIOLOGY

## 2023-04-26 PROCEDURE — 93978 US DOPP ILIACS BILATERAL: ICD-10-PCS | Mod: 26,TXP,, | Performed by: STUDENT IN AN ORGANIZED HEALTH CARE EDUCATION/TRAINING PROGRAM

## 2023-04-26 PROCEDURE — 76776 US TRANSPLANT KIDNEY WITH DOPPLER: ICD-10-PCS | Mod: 26,TXP,, | Performed by: STUDENT IN AN ORGANIZED HEALTH CARE EDUCATION/TRAINING PROGRAM

## 2023-04-26 PROCEDURE — 71046 X-RAY EXAM CHEST 2 VIEWS: CPT | Mod: TC,TXP

## 2023-04-26 PROCEDURE — 93978 VASCULAR STUDY: CPT | Mod: TC,TXP

## 2023-04-26 PROCEDURE — 99245 OFF/OP CONSLTJ NEW/EST HI 55: CPT | Mod: S$PBB,TXP,, | Performed by: TRANSPLANT SURGERY

## 2023-04-26 PROCEDURE — 71046 XR CHEST PA AND LATERAL: ICD-10-PCS | Mod: 26,TXP,, | Performed by: RADIOLOGY

## 2023-04-26 PROCEDURE — 84156 ASSAY OF PROTEIN URINE: CPT | Mod: TXP | Performed by: NURSE PRACTITIONER

## 2023-04-26 PROCEDURE — 99999 PR PBB SHADOW E&M-EST. PATIENT-LVL V: CPT | Mod: PBBFAC,TXP,, | Performed by: NURSE PRACTITIONER

## 2023-04-26 PROCEDURE — 99215 OFFICE O/P EST HI 40 MIN: CPT | Mod: PBBFAC,25,TXP | Performed by: NURSE PRACTITIONER

## 2023-04-26 PROCEDURE — 99999 PR PBB SHADOW E&M-EST. PATIENT-LVL V: ICD-10-PCS | Mod: PBBFAC,TXP,, | Performed by: NURSE PRACTITIONER

## 2023-04-26 PROCEDURE — 76776 US EXAM K TRANSPL W/DOPPLER: CPT | Mod: TC,TXP

## 2023-04-26 PROCEDURE — 76770 US EXAM ABDO BACK WALL COMP: CPT | Mod: TC,59,TXP

## 2023-04-26 PROCEDURE — 99205 OFFICE O/P NEW HI 60 MIN: CPT | Mod: S$PBB,TXP,, | Performed by: NURSE PRACTITIONER

## 2023-04-26 PROCEDURE — 93978 VASCULAR STUDY: CPT | Mod: 26,TXP,, | Performed by: STUDENT IN AN ORGANIZED HEALTH CARE EDUCATION/TRAINING PROGRAM

## 2023-04-26 PROCEDURE — 72170 X-RAY EXAM OF PELVIS: CPT | Mod: TC,TXP

## 2023-04-26 RX ORDER — PANTOPRAZOLE SODIUM 40 MG/1
40 TABLET, DELAYED RELEASE ORAL DAILY
COMMUNITY
Start: 2023-04-03

## 2023-04-26 RX ORDER — INSULIN GLARGINE 300 U/ML
40 INJECTION, SOLUTION SUBCUTANEOUS EVERY MORNING
COMMUNITY
Start: 2023-01-09

## 2023-04-26 RX ORDER — FERRIC CITRATE 210 MG/1
210 TABLET, COATED ORAL
COMMUNITY
Start: 2023-04-02

## 2023-04-26 RX ORDER — ASPIRIN 81 MG/1
81 TABLET ORAL DAILY
COMMUNITY
Start: 2023-01-06

## 2023-04-26 NOTE — PROGRESS NOTES
Transplant Surgery  Kidney Transplant Recipient Evaluation    Referring Physician: Shai Moore  Current Nephrologist: Shai Moore    Subjective:     Reason for Visit: evaluate transplant candidacy    History of Present Illness: Anthony Burgess is a 63 y.o. year old male undergoing transplant evaluation.    Dialysis History: Anthony is on hemodialysis.      Transplant History: 7/13/2013 (Kidney)    Etiology of Renal Disease: Membranous Glomerulonephritis (based on medical records from referral).    External provider notes reviewed: Yes    Review of Systems   Constitutional:  Negative for activity change, appetite change, chills, diaphoresis, fatigue, fever and unexpected weight change.   HENT:  Negative for congestion, dental problem, ear pain, facial swelling, mouth sores, nosebleeds, sore throat, tinnitus, trouble swallowing and voice change.    Eyes:  Negative for photophobia, pain and visual disturbance.   Respiratory:  Negative for apnea, cough, choking, chest tightness and shortness of breath.    Cardiovascular:  Negative for chest pain, palpitations and leg swelling.   Gastrointestinal:  Negative for abdominal distention, abdominal pain, blood in stool, constipation, diarrhea, nausea and vomiting.   Endocrine: Negative for cold intolerance and heat intolerance.   Genitourinary:  Negative for difficulty urinating, dysuria, flank pain, hematuria and urgency.   Musculoskeletal:  Negative for arthralgias and gait problem.   Skin:  Negative for color change, pallor and rash.   Neurological:  Negative for dizziness, tremors, seizures, syncope and light-headedness.   Hematological:  Negative for adenopathy. Does not bruise/bleed easily.   Psychiatric/Behavioral:  Negative for agitation and confusion.    Objective:     Physical Exam:  Constitutional:   Vitals reviewed: yes   Well-nourished and well-groomed: yes  Eyes:   Sclerae icteric: no   Extraocular movements intact: yes  GI:    Bowel sounds normal:  yes   Tenderness: no    If yes, quadrant/location: not applicable   Palpable masses: no    If yes, quadrant/location: not applicable   Hepatosplenomegaly: no   Ascites: no   Hernia: no    If yes, type/location: not applicable   Surgical scars: yes    If yes, type/location: left kidney transplant    Resp:   Effort normal: yes   Breath sounds normal: yes    CV:   Regular rate and rhythm: yes   Heart sounds normal: yes   Femoral pulses normal: no   Extremities edematous: no  Skin:   Rashes or lesions present: no    If yes, describe:not applicable   Jaundice:: no    Musculoskeletal:   Gait normal: yes   Strength normal: yes  Psych:   Oriented to person, place, and time: yes   Affect and mood normal: yes    Additional comments: not applicable    Diagnostics:  The following labs have been reviewed: CBC  BMP  CMP  PT  INR  PTT  ABO  PTH  The following radiology images have been independently reviewed and interpreted: CT Abd/Pelvis    Counseling: We provided Anthony DMITRI Burgess with a group education session today.  We discussed kidney transplantation at length with him, including risks, potential complications, and alternatives in the management of his renal failure.  The discussion included complications related to anesthesia, bleeding, infection, primary nonfunction, and ATN.  I discussed the typical postoperative course, length of hospitalization, the need for long-term immunosuppression, and the need for long-term routine follow-up.  I discussed living-donor and -donor transplantation and the relative advantages and disadvantages of each.  I also discussed average waiting times for both living donation and  donation.  I discussed national and center-specific survival rates.  I also mentioned the potential benefit of multicenter listing to candidates listed with centers within more than one organ procurement organization.  All questions were answered.    Patient advised that it is recommended that all  transplant candidates, and their close contacts and household members receive Covid vaccination.    Final determination of transplant candidacy will be made once evaluation is complete and reviewed by the Kidney & Kidney/Pancreas Selection Committee.    Coronavirus disease (COVID-19) caused by severe acute respiratory virus coronavirus 2 (SARS-C0V 2) is associated with increased mortality in solid organ transplant recipients (SOT) compared to non-transplant patients. Vaccine responses to vaccination are depressed against SARS-CoV2 compared to normal individuals but improve with third vaccination doses. Vaccination prior to SOT provides both the best opportunity for transplant candidates to develop protective immunity and to reduce the risk of serious COVID19 infections post transplantation. Organ transplant candidates at [facility] will be required to receive SARS-CoV-2 vaccination prior to being listed with a an active status, whenever possible. Exceptions will be made for disability related reasons or for sincerely held Latter day beliefs.          Transplant Surgery - Candidacy   Assessment/Plan:   Anthony Burgess has end stage renal disease (ESRD) on dialysis. I have concerns with aorto-iliac calcifications from CT Abd/Pelvis in 2013 without updated imaging to review.  I would recommend obtaining iliac ultrasound and non-contrast CT abd/pelvis. Based on available information, Anthony Burgess is a high-risk kidney transplant candidate.     Additional testing to be completed according to the Written Order Guidelines for Adult Pre-kidney and Pancreas Transplant Evaluation (KI-02).  Interpretation of tests and discussion of patient management involves all members of the multidisciplinary transplant team.    Amy Enriquez MD

## 2023-04-26 NOTE — TELEPHONE ENCOUNTER
Pt is a Kidney Tx from 2013. Pt 's wife states that kidney failed and is needing a new kidney and is scheduled for a workup tomorrow. Has labs at 7 am. Unsure if labs are fasting or not. States that information provided is contradictory and they are unsure of how to proceed for the work up in the morning. Triage nurse unable to answer the questions as the patient is scheduled for non fasting labs but multiple other test and radiology that does not indicate fasting or not. Call transferred to transplant coordinator on call through the . Pt advised per transplant coordinator on call at this time that he does not have to fast.   Reason for Disposition   Nursing judgment    Protocols used: No Guideline or Reference [id number]

## 2023-04-26 NOTE — PROGRESS NOTES
PRE-TRANSPLANT INFECTIOUS DISEASE CONSULT    Reason for Visit:  Pre-transplant evaluation  Referring Provider: Dr. Shai Moore     History of Present Illness:    63 y.o. male with a history of ESRD on HD presents for pre-kidney transplant evaluation.    Infectious History:  Recent hospital admissions: No  Recent infections: No  Recent or current antibiotic use: No  History of recurrent infections *(sinus / pneumonia / UTI / SBP)*: No  Recent dental infections, issues or procedures: No  History of chicken pox: Yes  History of shingles: No  History of STI: No  History of COVID infection: Yes    History of Immunosuppression:  Prior chemotherapy / immunosuppression: Yes  Prior transplant: Yes; L Kidney Tpx (2013).  History of splenectomy: No    Tuberculosis:  Prior screening for latent TB: No  Prior diagnosis of latent TB: No  Risk factors for TB *known exposure, incarceration, homelessness*: No    Geographical exposures:  Currently lives in [place] wife.  Lived in the following states: [place], [place].  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   Pets: No   Livestock: No  Fishing / hunting: No  Hobbies: relaxing, reading.  Water: [place] water  Consumption of raw/undercooked meat or seafood?  No  Tobacco: No; past 20pack yrs, quit .  Alcohol: No  Recreational drug use:  No  Sexual partners: wife,  14yrs.       Past Histories:   Past Medical History:   Diagnosis Date    Anemia of chronic renal failure     Anticoagulant long-term use     Antiphospholipid antibody syndrome     pulmonary embolism and arterial occlusions    Asthma     CKD (chronic kidney disease) 2013    CKD (chronic kidney disease), stage II 10/9/2013    Coronary artery disease     -donor kidney transplant for GN 13    Elevated glucose 2013    ESRD (end stage renal  disease) 2005    secondary to glomerulonephritis    Hyperglycemia 2013    Hyperlipidemia     Hypertension 2014    Hypertension, renal     Hypophosphatemia     Hypothyroidism     Kidney stone     Myocardial infarction     Neutropenia, drug-induced 2013    Prophylactic immunotherapy for kidney transplant     PVD (peripheral vascular disease)     s/p right fem-pop    Secondary hyperparathyroidism of renal origin      Past Surgical History:   Procedure Laterality Date    CARDIAC SURGERY      FEMORAL BYPASS      Right lower extremity    FRACTURE SURGERY      KIDNEY TRANSPLANT      2013    LEG SURGERY      TONSILLECTOMY      VASCULAR SURGERY       Family History   Problem Relation Age of Onset    Alzheimer's disease Mother     Hearing loss Mother     Stroke Father     Hearing loss Father     Heart disease Father     Hyperlipidemia Father     Hypertension Father     Diabetes Paternal Grandmother     Kidney disease Neg Hx      Social History     Tobacco Use    Smoking status: Former     Packs/day: 1.00     Years: 15.00     Pack years: 15.00     Types: Cigarettes     Quit date: 2003     Years since quittin.4    Smokeless tobacco: Former   Substance Use Topics    Alcohol use: No    Drug use: No     Review of patient's allergies indicates:   Allergen Reactions    Plavix [clopidogrel] Other (See Comments)     Bilat leg soreness and discoloration .    Doxycycline hcl Nausea And Vomiting         Immunization History:  Received all childhood vaccines: Yes  All household members receive annual flu vaccine: No  All household members are up to date on COVID vaccine: No    Immunization History   Administered Date(s) Administered    Influenza - Quadrivalent - PF *Preferred* (6 months and older) 2023    Influenza - Trivalent (ADULT) 10/13/2009    Pneumococcal Conjugate - 20 Valent 2023    Pneumococcal Polysaccharide - 23 Valent 10/13/2009    Tdap 10/13/2009          Current  antibiotics:  Antibiotics (From admission, onward)      None              Review of Systems  Review of Systems   Constitutional: Negative for chills, diaphoresis, fever and night sweats.   HENT:  Negative for sore throat.    Eyes:  Negative for pain and visual disturbance.   Cardiovascular:  Negative for chest pain.   Respiratory:  Negative for cough and shortness of breath.    Skin:  Negative for color change and rash.   Musculoskeletal:  Negative for joint pain, joint swelling and myalgias.   Gastrointestinal:  Negative for abdominal pain, diarrhea, dysphagia, jaundice and vomiting.   Genitourinary:  Negative for dysuria and flank pain.   Neurological:  Negative for headaches and seizures.   Allergic/Immunologic: Negative for persistent infections.        Objective  Physical Exam  Constitutional:       General: He is not in acute distress.     Appearance: Normal appearance. He is not ill-appearing, toxic-appearing or diaphoretic.   HENT:      Mouth/Throat:      Mouth: Mucous membranes are moist.      Pharynx: Oropharynx is clear.   Eyes:      General: No scleral icterus.     Conjunctiva/sclera: Conjunctivae normal.   Cardiovascular:      Rate and Rhythm: Normal rate.   Pulmonary:      Effort: Pulmonary effort is normal.   Abdominal:      Tenderness: There is no abdominal tenderness.   Musculoskeletal:         General: No swelling or tenderness.      Cervical back: Normal range of motion. No rigidity or tenderness.      Right lower leg: No edema.      Left lower leg: No edema.   Skin:     General: Skin is warm and dry.      Coloration: Skin is not jaundiced.      Findings: No erythema or rash.   Neurological:      Mental Status: He is alert and oriented to person, place, and time. Mental status is at baseline.   Psychiatric:         Behavior: Behavior normal.         Thought Content: Thought content normal.         Labs:    CBC:   Lab Results   Component Value Date    WBC 4.75 04/26/2023    HGB 12.2 (L) 04/26/2023     HCT 38.9 (L) 04/26/2023    MCV 95 04/26/2023     (L) 04/26/2023    GRAN 3.5 04/26/2023    GRAN 72.7 04/26/2023    LYMPH 0.6 (L) 04/26/2023    LYMPH 13.1 (L) 04/26/2023    MONO 0.5 04/26/2023    MONO 10.5 04/26/2023    EOSINOPHIL 2.7 04/26/2023       Syphilis screening:   Lab Results   Component Value Date    RPR Non-Reactive 10/30/2008        TB screening: No results found for: TBGOLDPLUS, TSPOTSCREN    HIV screening:   Lab Results   Component Value Date    RSL62MYFB Non-reactive 04/26/2023       Strongyloides IgG: No results found for: STRONGANTIGG    Hepatitis Serologies:   Lab Results   Component Value Date    HEPAIGG Reactive 04/26/2023    HEPBCAB Non-reactive 04/26/2023    HEPBSAB 379.93 04/26/2023    HEPBSAB Reactive 04/26/2023    HEPCAB Non-reactive 04/26/2023        Varicella IgG:   Lab Results   Component Value Date    VARICELLAINT Positive (A) 10/30/2008         Assessment and Plan    1. Risks of Infection: Available serologies were reviewed. No unusual risks of infection or significant barriers to transplantation were identified from the infectious disease standpoint given the information available at this time.    - Acute infectious issues: None   - Pending serologies: Hepatitis A Ab, Hepatitis B surface Ab, Hepatitis B core Ab, Hepatitis B surface Ag, Hepatitis C Ab, HIV, Quantiferon gold / T-spot, RPR, Strongyloides IgG, and VZV IgG   - Please call if any pending serologic testing is positive.    2. Immunizations:  Based on the patient's immunization history and serologies, the following immunizations are recommended:  - Hepatitis A    Patient does have immunity to hepatitis A    Vaccination ordered today: No. Reason for not ordering: immunity demonstrated on serology   - Hepatitis B    Patient does have immunity to hepatitis B    Vaccination ordered today: No. Reason for not ordering: Immunity   - COVID    Current CDC vaccination recommendations were discussed with the patient   - Annual  high dose influenza     Vaccination ordered today: No. Reason for not ordering: vaccination up to date   - Prevnar 20    Vaccination ordered today: No. Reason for not ordering: vaccination up to date   - Tdap    Vaccination ordered today: Yes   - Shingrix    Vaccination ordered today: Yes    Recommended Pre-Transplant Immunization Schedule   Vaccine  0m 1m 2m 6m   Pneumococcal conjugate vaccine (Prevnar 20) X      Tetanus-diphtheria-pertussis (Tdap)* X      Hepatitis A Vaccine (Havrix)** X   X   Hepatitis B Vaccine (Heplisav)** X X     Influenza (annual) X      Zoster Recombinant Vaccine (Shingrix) X  X           *Administer booster every 10 years.       **Administer if no immunity demonstrated on serologies               Patient will receive vaccines at local pharmacy. A written prescription was provided for all vaccine doses.         3. Counseling:   I discussed with the patient the risk for increased susceptibility to infections following transplantation including increased risk for infection right after transplant and if rejection should occur.  The patient has been counseled on the importance of vaccinations to decrease risk of infection and severe illness. Specific guidance has been provided to the patient regarding the patient's occupation, hobbies and activities to avoid future infectious complications.     4. Transplant Candidacy: Based on available information, there are no identified significant barriers to transplantation from an infectious disease standpoint.  Final determination of transplant candidacy will be made once evaluation is complete and reviewed by the Selection Committee.      Follow up with infectious disease as needed.       The total time for evaluation and management services performed on 04/26/2023 was greater than 30 minutes.

## 2023-04-26 NOTE — PROGRESS NOTES
Agree with resident A+P.  Patient seen and examined.  Discussed patient care with resident team.      Cash Carreno MD  ( 8/29/2017 )     INITIAL PATIENT EDUCATION NOTE    Mr. Anthony Burgess was seen in pre-kidney transplant clinic for evaluation for kidney, kidney/pancreas or pancreas only transplant.  The patient attended a an individual video education session that discussed/reviewed the following aspects of transplantation: evaluation including diagnostic and laboratory testing,( Chemistries, Hematology, Serologies including HIV and Hepatitis and HLA) required for transplantation and selection committee process, UNOS waitlist management/multiple listings, types of organs offered (KDPI < 85%, KDPI > 85%, PHS risk, DCD, HCV+, HIV+ for HIV+ recipients and enbloc/dual), financial aspects, surgical procedures, dietary instruction pre- and post-transplant, health maintenance pre- and post-transplant, post-transplant hospitalization and outpatient follow-up, potential to participate in a research protocol, and medication management and side effects.  A question and answer session was provided after the presentation.    The patient was seen by all members of the multi-disciplinary team to include: Nephrologist/DAYO, Surgeon, , Transplant Coordinator, , Pharmacist and Dietician (if applicable).    The patient reviewed and signed all consents for evaluation which were witnessed and sent to scanning into the [facility] chart.    The patient was given an education book and plan for further evaluation based on his individual assessment.      Reviewed program requirement for complete COVID vaccination with documentation prior to listing.  COVID education information reviewed with patient. Patient encouraged to be up to date on all vaccinations.       The patient was informed that the transplant team would manage immediate post op pain. If the patient requires long term pain management, they will need to have that pain management addressed by their PCP or previous provider who wrote for long term pain medicines.    The patient was  encouraged to call with any questions or concerns.

## 2023-04-26 NOTE — TELEPHONE ENCOUNTER
Late entry:  4/25/23 @ 20:15- received call from [facility] , transferring a call from kidney pt requesting to speak to kidney transplant coordinator.  Spoke to pt's wife who reports that pt is scheduled for labs tomorrow morning but she does not know if he is supposed to fast, confirmed appointments for non-fasting lab and ultrasound tomorrow morning.

## 2023-04-26 NOTE — LETTER
April 28, 2023        Shai Moore  [address] [place] [address] [address]  [place] [address]  Phone: [phone]  Fax: [phone]             [facility] 1st Fl  [address]  [facility] [address]  Phone: [phone]   Patient: Anthony Burgess   MR Number: [id number]   YOB: 1960   Date of Visit: 4/26/2023       Dear Dr. Shai Moore    Thank you for referring Anthony Burgess to me for evaluation. Attached you will find relevant portions of my assessment and plan of care.    If you have questions, please do not hesitate to call me. I look forward to following Anthony Burgess along with you.    Sincerely,    Clarisa Lilly NP    Enclosure    If you would like to receive this communication electronically, please contact [email or web address] or [phone] to request [employer] access.    [employer] is a tool which provides read-only access to select patient information with whom you have a relationship. Its easy to use and provides real time access to review your patients record including encounter summaries, notes, results, and demographic information.    If you feel you have received this communication in error or would no longer like to receive these types of communications, please e-mail [email or web address]

## 2023-04-26 NOTE — PROGRESS NOTES
PHARM.D. PRE-TRANSPLANT NOTE:    This patient's medication therapy was evaluated as part of his pre-transplant evaluation.      The following general pharmacologic concerns were noted: none - history of blood clots but stated due to being on warfarin in the past      The following concerns for post-operative pain management were noted: none     The following pharmacologic concerns related to HCV therapy were noted: none     This patient's medication profile was reviewed for considerations for DAA Hepatitis C therapy:    [X]  No current inducers of CYP 3A4 or PGP  [X]  No amiodarone on this patient's EMR profile in the last 24 months  [X]  No past or current atrial fibrillation on this patient's EMR profile       Current Outpatient Medications   Medication Sig Dispense Refill    aspirin (ECOTRIN) 81 MG EC tablet Take 81 mg by mouth once daily.      AURYXIA 210 mg iron Tab Take 210 mg by mouth 3 (three) times daily with meals.      hydrALAZINE (APRESOLINE) 25 MG tablet Take 2 tablets (50 mg total) by mouth every 12 (twelve) hours. (Patient taking differently: Take 50 mg by mouth every 8 (eight) hours.) 90 tablet 0    insulin glargine, TOUJEO, (TOUJEO SOLOSTAR U-300 INSULIN) 300 unit/mL (1.5 mL) InPn pen Inject 40 Units into the skin every morning.      levothyroxine (TIROSINT) 112 mcg Cap Take 112 mcg by mouth once daily.      linagliptin (TRADJENTA) 5 mg Tab tablet Take 5 mg by mouth once daily.      losartan (COZAAR) 50 MG tablet Take 100 mg by mouth once daily.      magnesium oxide (MAG-OX) 400 mg tablet 800mg in AM PO and 400mg in PM PO.      metoprolol tartrate (LOPRESSOR) 50 MG tablet Take 100 mg by mouth 2 (two) times daily.      mycophenolate (CELLCEPT) 250 mg Cap TAKE (2) CAPSULES TWICE DAILY. 120 capsule 11    NIFEdipine (PROCARDIA-XL) 30 MG (OSM) 24 hr tablet Take 1 tablet (30 mg total) by mouth once daily. Hold if BP < 120/80 (Patient taking differently: Take 120 mg by mouth once daily. Hold if BP <  120/80) 30 tablet 11    pantoprazole (PROTONIX) 40 MG tablet Take 40 mg by mouth once daily.       No current facility-administered medications for this visit.           I am available for consultation and can be contacted, as needed by the other members of the Transplant team.

## 2023-04-26 NOTE — PROGRESS NOTES
Transplant Nephrology  Kidney Transplant Recipient Evaluation    Referring Physician: Shai Moore  Current Nephrologist: Shai Moore    Subjective:   CC:  Initial evaluation of kidney transplant candidacy.    HPI:  Mr. Burgess is a 63 y.o. year old White male who has presented to be evaluated as a potential kidney transplant recipient.  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  Patient is currently on hemodialysis started on 2023. Patient is dialyzing on home HD 5 days/week.  Patient reports that he is tolerating dialysis well.. He has a RUE AV graft for dialysis access.     First diagnosed with kidney disease around . Had renal biopsy done  with membranous GN. 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 IS with prograf and MMF. Developed post transplant DM2. Developed proteinuria > 4 gram in , underwent allograft biopsy which showed recurrence of GN and was treated with several doses of rituxan. 2020 had worsening of renal function; however, patient refused to get repeat kidney biopsy due to COVID. Last visit with transplant 10/2021, has been following with general nephrology and ultimately restarted dialysis earlier this year. Prograf was stopped by his nephrologist, but he remains on MMF.    Antiphospholipid antibody syndrome, he reports that he stopped taking coumadin about a year ago. He believes that blood thinners were causing him to clot.  Does have IVC filter in place. Also with history of CAD, PAD, coronary stents, CABG , atherectomy legs, PE.    Remains active taking care of his 3 acres of land. Denies CP, SOB, or claudication with exertion. Looks good. Has friend that has expressed interest in donation.     Last colonoscopy done at [facility] about 5 years ago.    Current Outpatient Medications   Medication Sig Dispense Refill    aspirin (ECOTRIN) 81 MG EC tablet Take 81 mg by mouth once daily.       AURYXIA 210 mg iron Tab Take 210 mg by mouth 3 (three) times daily with meals.      hydrALAZINE (APRESOLINE) 25 MG tablet Take 2 tablets (50 mg total) by mouth every 12 (twelve) hours. (Patient taking differently: Take 50 mg by mouth every 8 (eight) hours.) 90 tablet 0    insulin glargine, TOUJEO, (TOUJEO SOLOSTAR U-300 INSULIN) 300 unit/mL (1.5 mL) InPn pen Inject 40 Units into the skin every morning.      levothyroxine (TIROSINT) 112 mcg Cap Take 112 mcg by mouth once daily.      linagliptin (TRADJENTA) 5 mg Tab tablet Take 5 mg by mouth once daily.      losartan (COZAAR) 50 MG tablet Take 100 mg by mouth once daily.      magnesium oxide (MAG-OX) 400 mg tablet 800mg in AM PO and 400mg in PM PO.      metoprolol tartrate (LOPRESSOR) 50 MG tablet Take 100 mg by mouth 2 (two) times daily.      mycophenolate (CELLCEPT) 250 mg Cap TAKE (2) CAPSULES TWICE DAILY. 120 capsule 11    NIFEdipine (PROCARDIA-XL) 30 MG (OSM) 24 hr tablet Take 1 tablet (30 mg total) by mouth once daily. Hold if BP < 120/80 (Patient taking differently: Take 120 mg by mouth once daily. Hold if BP < 120/80) 30 tablet 11    pantoprazole (PROTONIX) 40 MG tablet Take 40 mg by mouth once daily.       No current facility-administered medications for this visit.       Past Medical History:   Diagnosis Date    Anemia of chronic renal failure     Anticoagulant long-term use     Antiphospholipid antibody syndrome     pulmonary embolism and arterial occlusions    Asthma     CKD (chronic kidney disease) 2013    CKD (chronic kidney disease), stage II 10/9/2013    Coronary artery disease     -donor kidney transplant for GN 13    Elevated glucose 2013    ESRD (end stage renal disease) 2005    secondary to glomerulonephritis    Hyperglycemia 2013    Hyperlipidemia     Hypertension 2014    Hypertension, renal     Hypophosphatemia     Hypothyroidism     Kidney stone     Myocardial infarction     Neutropenia,  drug-induced 2013    Prophylactic immunotherapy for kidney transplant     PVD (peripheral vascular disease)     s/p right fem-pop    Secondary hyperparathyroidism of renal origin      Past Medical and Surgical History: Mr. Burgess  has a past medical history of Anemia of chronic renal failure, Anticoagulant long-term use, Antiphospholipid antibody syndrome, Asthma, CKD (chronic kidney disease), CKD (chronic kidney disease), stage II, Coronary artery disease, -donor kidney transplant for GN 13, Elevated glucose, ESRD (end stage renal disease), Hyperglycemia, Hyperlipidemia, Hypertension, Hypertension, renal, Hypophosphatemia, Hypothyroidism, Kidney stone, Myocardial infarction, Neutropenia, drug-induced, Prophylactic immunotherapy for kidney transplant, PVD (peripheral vascular disease), and Secondary hyperparathyroidism of renal origin.  He has a past surgical history that includes Femoral bypass (); Leg Surgery; Cardiac surgery; Fracture surgery; Tonsillectomy; Vascular surgery; and Kidney transplant.    Past Social and Family History: Mr. Burgess reports that he quit smoking about 19 years ago. His smoking use included cigarettes. He has a 15.00 pack-year smoking history. He has quit using smokeless tobacco. He reports that he does not drink alcohol and does not use drugs. His family history includes Alzheimer's disease in his mother; Diabetes in his paternal grandmother; Hearing loss in his father and mother; Heart disease in his father; Hyperlipidemia in his father; Hypertension in his father; Stroke in his father.    Review of Systems   Constitutional:  Positive for fatigue. Negative for activity change, appetite change and fever.   HENT:  Negative for congestion, mouth sores and sore throat.    Eyes:  Positive for visual disturbance.        Wears glasses   Respiratory:  Negative for cough, chest tightness and shortness of breath.    Cardiovascular:  Negative for chest pain, palpitations  "and leg swelling.   Gastrointestinal:  Negative for abdominal distention, abdominal pain, constipation, diarrhea and nausea.   Genitourinary:  Positive for decreased urine volume. Negative for difficulty urinating, frequency and hematuria.   Musculoskeletal:  Negative for arthralgias and gait problem.   Skin:  Negative for wound.   Allergic/Immunologic: Positive for immunocompromised state. Negative for environmental allergies and food allergies.   Neurological:  Negative for dizziness, weakness and numbness.   Psychiatric/Behavioral:  Negative for sleep disturbance. The patient is not nervous/anxious.      Objective:   Blood pressure (!) 204/93, pulse 60, temperature 97.2 °F (36.2 °C), temperature source Temporal, resp. rate 18, height 6' 0.21" (1.834 m), weight 92.7 kg (204 lb 5.9 oz), SpO2 98 %.body mass index is 27.56 kg/m².    Physical Exam  Vitals and nursing note reviewed.   Constitutional:       Appearance: Normal appearance.   HENT:      Head: Normocephalic.   Cardiovascular:      Rate and Rhythm: Normal rate and regular rhythm.      Heart sounds: Normal heart sounds.   Pulmonary:      Effort: Pulmonary effort is normal.      Breath sounds: Normal breath sounds.   Abdominal:      General: Bowel sounds are normal. There is no distension.      Palpations: Abdomen is soft.      Tenderness: There is no abdominal tenderness.      Comments: LLQ transplant scar   Musculoskeletal:         General: Normal range of motion.      Comments: Right AVG   Skin:     General: Skin is warm and dry.   Neurological:      General: No focal deficit present.      Mental Status: He is alert.   Psychiatric:         Behavior: Behavior normal.       Labs:  Lab Results   Component Value Date    WBC 4.75 04/26/2023    HGB 12.2 (L) 04/26/2023    HCT 38.9 (L) 04/26/2023     04/26/2023    K 4.1 04/26/2023     04/26/2023    CO2 28 04/26/2023    BUN 42 (H) 04/26/2023    CREATININE 8.1 (H) 04/26/2023    EGFRNORACEVR 6.9 (A) " 2023    CALCIUM 9.2 2023    PHOS 6.9 (H) 2023    MG 1.6 2017    ALBUMIN 2.5 (L) 2023    AST 19 2023    ALT 12 2023    UTPCR 6.96 (H) 2018    .7 (H) 2023    TACROLIMUS 5.2 2018       Lab Results   Component Value Date    BILIRUBINUA Negative 2017    PROTEINUA 3+ (A) 2017    NITRITE Negative 2017    RBCUA 2 2017    WBCUA 0 2017       Lab Results   Component Value Date    HLAABCTYPE A1,A3 10/30/2008    HLAABCTYPE B7(BW6),B8(BW6) 10/30/2008    HLAABCTYPE CW7,CWX 10/30/2008       Labs were reviewed with the patient.    Assessment:     1. Pre-transplant evaluation for kidney transplant    2. ESRD on hemodialysis    3. -donor kidney transplant for GN 13    4. Membranous nephropathy determined by biopsy    5. Immunization counseling    6. Antiphospholipid antibody syndrome on indefinite anticoagulation    7. Coronary artery disease involving native coronary artery of native heart without angina pectoris    8. Primary hypertension      Plan:   63 y.o. year old White male who has presented to be evaluated as a potential kidney transplant recipient.  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  Patient is currently on hemodialysis started on 2023. Discussed risk of recurrence of GN with new transplant, UPC collected in clinic and will need PLA2R with next lab work. He will need stress test and cardiology clearance due to cardiac history as well as hematology evaluation of antiphospholipid antibody syndrome and anticoagulation as he stopped taking coumadin last year. Will obtain colonoscopy records from [facility].       Transplant Candidacy:   Based on available information, Mr. Burgess is a high-risk kidney transplant candidate due to re-transplant, CAD/PAD, hypercoagulable state.   Meets center eligibility for accepting HCV+ donor offer - Yes.  Patient educated on HCV+  donors. Anthony is willing to accept HCV+ donor offer - Yes   Patient is a candidate for KDPI > 85 kidney donor offer - No (weight > 88 kg).  Final determination of transplant candidacy will be made once workup is complete and reviewed by the selection committee.    Patient advised that it is recommended that all transplant candidates, and their close contacts and household members receive Covid vaccination.    UNOS Patient Status  Functional Status: 90% - Able to carry on normal activity: minor symptoms of disease      Clarisa Lilly, NP

## 2023-05-01 NOTE — PROGRESS NOTES
Transplant Recipient Adult Psychosocial Assessment    Anthony Burgess  [address] [facility] [address]  Telephone Information:   Mobile [phone]   Mobile [phone]   Home  [phone] (home)  Work  There is no work phone number on file.  E-mail  [email or web address]    Sex: male  YOB: 1960  Age: 63 y.o.    Encounter Date: 4/26/2023  [place] Citizen: yes  Primary Language: English   Needed: no    Emergency Contact:  Name: Brandi Burgess  Relationship: wife  Address: [address] [place]  Phone Numbers:  [phone] (home), [phone] (mobile)    Family/Social Support:   Number of dependents/: no dependents; all children are adults  Marital history:  x2 and to current wife of 14 yrs  Other family dynamics: One sister and one brother who pt reports are supportive.  Four children-2 biological, 2 stepchildren.    Household Composition:  Name: Mark Burgess  Age: 62 and 65  Relationship: patient and wife  Does person drive? yes      Do you and your caregivers have access to reliable transportation? yes  PRIMARY CAREGIVER: Brandi Burgess will be primary caregiver, phone number [phone].      provided in-depth information to patient and caregiver regarding pre- and post-transplant caregiver role.   strongly encourages patient and caregiver to have concrete plan regarding post-transplant care giving, including back-up caregiver(s) to ensure care giving needs are met as needed.    Patient and Caregiver states understanding all aspects of caregiver role/commitment and is able/willing/committed to being caregiver to the fullest extent necessary.    Patient and Caregiver verbalizes understanding of the education provided today and caregiver responsibilities.         remains available. Patient and Caregiver agree to contact  in a timely manner if concerns arise.      Able to take time off work  without financial concerns: yes.     Additional Significant Others who will Assist with Transplant:  Patient and wife were unable to identify any other friends or family who could assist in pt's post operative care.    Living Will: no  Healthcare Power of : no  Advance Directives on file: <<no information> per medical record.  Verbally reviewed LW/HCPA information.   provided patient with copy of LW/HCPA documents and provided education on completion of forms.    Living Donors: No. Education and resource information given to patient.    Highest Education Level: High School (9-12) or GED  Reading Ability: 11th grade  Reports difficulty with: memory  Learns Best By:  video     Status: no  VA Benefits: no     Working for Income: No  If no, reason not working: Disability  Patient is disabled.  Spouse/Significant Other Employment: disabled homemaker.     Disabled: yes: date disability began: , due to: ESRD.    Monthly Income:  Other: $combined income 3534.00  Able to afford all costs now and if transplanted, including medications: yes  Patient and Caregiver verbalizes understanding of personal responsibilities related to transplant costs and the importance of having a financial plan to ensure that patients transplant costs are fully covered.       provided fundraising information/education. Patient and Caregiververbalizes understanding.   remains available.    Insurance:   Payer/Plan Subscr  Sex Relation Sub. Ins. ID Effective Group Num   1. MEDICARE - ME* TIMOTHY DYSON 1960 Male Self [id number] 3/1/06                                    [address]   2. BLUE CROSS BL* TIMOTHY DYSON 1960 Male Self [id number]* [id number]                                   [address] [address]     Primary Insurance (for UNOS reporting): Public Insurance - Medicare FFS (Fee For Service)  Secondary Insurance (for UNOS reporting): Public Insurance - Other  "Government  Patient and Caregiver verbalizes clear understanding that patient may experience difficulty obtaining and/or be denied insurance coverage post-surgery. This includes and is not limited to disability insurance, life insurance, health insurance, burial insurance, long term care insurance, and other insurances.      Patient and Caregiver also reports understanding that future health concerns related to or unrelated to transplantation may not be covered by patient's insurance.  Resources and information provided and reviewed.     Patient and Caregiver provides verbal permission to release any necessary information to outside resources for patient care and discharge planning.  Resources and information provided are reviewed.      Dialysis Adherence: Patient reports he is adherent with all dialysis treatments.  He does home hemo.  Dialysis compliance found under "Media" tab->"dialysis compliance".  Compliance reported as satisfactory.     Infusion Service: patient utilizing? yes  Home Health: patient utilizing? no  DME:  home hemo equipment and supplies  Pulmonary/Cardiac Rehab: denies   ADLS:  Pt states ability to independently accomplish all adls.     Adherence:   Pt states current and expected compliance with all healthcare recommendations. .  Adherence education and counseling provided.     Per History Section:  Past Medical History:   Diagnosis Date    Anemia of chronic renal failure     Anticoagulant long-term use     Antiphospholipid antibody syndrome     pulmonary embolism and arterial occlusions    Asthma     CKD (chronic kidney disease) 2013    CKD (chronic kidney disease), stage II 10/9/2013    Coronary artery disease     -donor kidney transplant for GN 13    Elevated glucose 2013    ESRD (end stage renal disease) 2005    secondary to glomerulonephritis    Hyperglycemia 2013    Hyperlipidemia     Hypertension 2014    Hypertension, renal     " Hypophosphatemia     Hypothyroidism     Kidney stone     Myocardial infarction     Neutropenia, drug-induced 2013    Prophylactic immunotherapy for kidney transplant     PVD (peripheral vascular disease)     s/p right fem-pop    Secondary hyperparathyroidism of renal origin      Social History     Tobacco Use    Smoking status: Former     Packs/day: 1.00     Years: 15.00     Pack years: 15.00     Types: Cigarettes     Quit date: 2003     Years since quittin.4    Smokeless tobacco: Former   Substance Use Topics    Alcohol use: No     Social History     Substance and Sexual Activity   Drug Use No     Social History     Substance and Sexual Activity   Sexual Activity Yes    Partners: Female       Per Today's Psychosocial:  Tobacco: none, patient denies any use.  Alcohol: none, patient denies any use.  Illicit Drugs/Non-prescribed Medications: none, patient denies any use.    Patient and Caregiver states clear understanding of the potential impact of substance use as it relates to transplant candidacy and is aware of possible random substance screening.  Substance abstinence/cessation counseling, education and resources provided and reviewed.     Arrests/DWI/Treatment/Rehab: patient denies    Psychiatric History:    Mental Health:  denies concerns  Psychiatrist/Counselor: denies  Medications:  denies  Suicide/Homicide Issues: denies    Safety at home: denies    Knowledge: Patient states having clear understanding and realistic expectations regarding the potential risks and potential benefits of organ transplantation and organ donation and agrees to discuss with health care team members and support system members, as well as to utilize available resources and express questions and/or concerns in order to further facilitate the pt informed decision-making.  Resources and information provided and reviewed.    Patient and Caregiver is aware of [facility]'s affiliation and/or partnership with agencies in  home health care, LTAC, SNF, DME, and other hospitals and clinics.    Understanding: Patient and Caregiver reports having a clear understanding of the many lifetime commitments involved with being a transplant recipient, including costs, compliance, medications, lab work, procedures, appointments, concrete and financial planning, preparedness, timely and appropriate communication of concerns, abstinence (ETOH, tobacco, illicit non-prescribed drugs), adherence to all health care team recommendations, support system and caregiver involvement, appropriate and timely resource utilization and follow-through, mental health counseling as needed/recommended, and patient and caregiver responsibilities.  Social Service Handbook, resources and detailed educational information provided and reviewed.  Educational information provided.    Patient and Caregiver also reports current and expected compliance with health care regime and states having a clear understanding of the importance of compliance.      Patient and Caregiver reports a clear understanding that risks and benefits may be involved with organ transplantation and with organ donation.       Patient and Caregiver also reports clear understanding that psychosocial risk factors may affect patient, and include but are not limited to feelings of depression, generalized anxiety, anxiety regarding dependence on others, post traumatic stress disorder, feelings of guilt and other emotional and/or mental concerns, and/or exacerbation of existing mental health concerns.  Detailed resources provided and discussed.      Patient and Caregiver agrees to access appropriate resources in a timely manner as needed and/or as recommended, and to communicate concerns appropriately.  Patient and Caregiver also reports a clear understanding of treatment options available.     Patient and Caregiver received education in a group setting.   reviewed education, provided additional  information, and answered questions.    Feelings or Concerns: pt denies concerns    Coping: Identify Patient & Caregiver Strategies to [place]:   1. In the past, coping with major surgery and/or related stress - Had a kidney transplant 20 yrs ago.   2. Currently & Pre-transplant - family support, woodworking, playing guitar, caring for property   3. At the time of surgery - family support   4. During post-Transplant & Recovery Period - family support    Goals: get off dialysis.  Patient referred to Vocational Rehabilitation.    Interview Behavior: Patient and Caregiver presents as alert and oriented x 4, pleasant, good eye contact, well groomed, recall good, concentration/judgement good, average intelligence, calm, communicative, cooperative, and asking and answering questions appropriately.          Transplant Social Work - Candidacy  Assessment/Plan:     Psychosocial Suitability: Based on psychosocial risk factors, patient presents as medium risk, due to lack of identified backup caregiver, past issues with compliance.  Pt does report solid support from wife, financial plan, transportation and lodging plans .    Recommendations/Additional Comments: Post op, pt and wife plan to stay at lodging arranged by themselves.  Pt and caregiver informed that lodging assistance will be unavailable beginning 2023.  - information reviewed in depth. Recommend fundraising to offset costs associated with transplant.  Pt and caregiver informed that they are responsible for transplant related lodging arrangements and expense.       Cathryn Redd LCSW

## 2023-05-03 ENCOUNTER — DOCUMENTATION ONLY (OUTPATIENT)
Dept: TRANSPLANT | Facility: CLINIC | Age: 63
End: 2023-05-03
Payer: MEDICARE

## 2023-05-10 NOTE — PROGRESS NOTES
Iliac calcifications and stents are present.  Prior kidney transplant    Need noncon CT pelvis

## 2023-05-12 ENCOUNTER — TELEPHONE (OUTPATIENT)
Dept: TRANSPLANT | Facility: CLINIC | Age: 63
End: 2023-05-12
Payer: MEDICARE

## 2023-05-12 NOTE — TELEPHONE ENCOUNTER
Attempted to return phone call. Latesha not available.     -----  Regarding: FW: Patient Advice    ----- Message -----  From: Carmella Rodriguez  Sent: 5/11/2023  10:49 AM CDT  To: [facility] Post-Kidney Transplant Clinical  Subject: Patient Advice                                   Name of Caller: Latesha         Contact Preference: [phone]        Nature of Call: [facility] requesting a call back to get information on test ordered (REINA 2R) not sure which type of test is required would like to discuss

## 2023-05-31 ENCOUNTER — PATIENT MESSAGE (OUTPATIENT)
Dept: TRANSPLANT | Facility: CLINIC | Age: 63
End: 2023-05-31
Payer: MEDICARE

## 2023-06-06 ENCOUNTER — TELEPHONE (OUTPATIENT)
Dept: TRANSPLANT | Facility: CLINIC | Age: 63
End: 2023-06-06
Payer: MEDICARE

## 2023-06-06 NOTE — TELEPHONE ENCOUNTER
Returned phone call. Informed patient will need cardiology clearance/ risk stratification for a potential kidney transplant surgery. Maddisoncandiesharmaine reports understanding. States she will notify patient's doctor.     -----  Regarding: FW: Patient Care  Contact: Eben    ----- Message -----  From: Uriel Robledo  Sent: 6/1/2023  11:11 AM CDT  To: [facility] Pre-Kidney Transplant Non-Clinical  Subject: Patient Care                                     [facility] [place] [place] called in to clarification on what type of clearance is needed for patient. They got a brief description from pt's spouse but need more info to be certain. Requested a call back at number listed below.                           Contact: [phone]

## 2023-06-08 ENCOUNTER — PATIENT MESSAGE (OUTPATIENT)
Dept: TRANSPLANT | Facility: CLINIC | Age: 63
End: 2023-06-08
Payer: MEDICARE

## 2023-06-09 ENCOUNTER — TELEPHONE (OUTPATIENT)
Dept: TRANSPLANT | Facility: CLINIC | Age: 63
End: 2023-06-09
Payer: MEDICARE

## 2023-06-09 NOTE — TELEPHONE ENCOUNTER
----- Message from Mellisa Garcia RN sent at 6/9/2023 10:19 AM CDT -----  Regarding: FW: recent testing/status    ----- Message -----  From: Malini Magana  Sent: 6/8/2023   2:50 PM CDT  To: [facility] Pre-Kidney Transplant Clinical  Subject: recent testing/status                            The patient called requesting to speak to Nurse coordinator  She is asking if you have recieved some recent heart testing results  Wife also has some other questions, please reach out at your earliest convenience    No further information provided      Patient can be contacted @# [phone] (home)

## 2023-06-09 NOTE — TELEPHONE ENCOUNTER
Returned pt's wife's call regarding outside testing. I informed her that we've received his stress test. We're still awaiting his cards clearance, colon, and hem/onc clearance. Pt's wife will upload records into my chart once completed.

## 2023-06-26 ENCOUNTER — PATIENT MESSAGE (OUTPATIENT)
Dept: TRANSPLANT | Facility: CLINIC | Age: 63
End: 2023-06-26
Payer: MEDICARE

## 2023-07-05 ENCOUNTER — PATIENT MESSAGE (OUTPATIENT)
Dept: TRANSPLANT | Facility: CLINIC | Age: 63
End: 2023-07-05
Payer: MEDICARE

## 2023-08-09 ENCOUNTER — TELEPHONE (OUTPATIENT)
Dept: TRANSPLANT | Facility: CLINIC | Age: 63
End: 2023-08-09
Payer: MEDICARE

## 2023-08-09 NOTE — TELEPHONE ENCOUNTER
----- Message from Carlos Murdock MD sent at 8/9/2023 12:19 PM CDT -----  Regarding: RE: CT review  Not sure if we're still considering him but it looks barely feasible, needs periodic repeat CT if listed.  His right external iliac looks mostly free of calcification, but his common is very diseased and raises the question of inflow limitation despite his reasonable dopplers.    ----- Message -----  From: Curtis Ovalles Jr., MD  Sent: 6/20/2023   9:08 AM CDT  To: Carlos Murdock MD; Padmini Corado RN  Subject: RE: CT review                                    Prior kidney on left.  Fairly severe calcifications on the right, where there may be a place to clamp.    High risk. Highly likely to progress and fall off the list.  If approved he'll need repeat CT every 2 years.  Sending to Dr. Murdock for his thoughts.     ----- Message -----  From: Padmini Corado RN  Sent: 6/19/2023   4:55 PM CDT  To: Curtis Ovalles Jr., MD  Subject: CT review                                        Can you please review the CT (under previous ) that was imported ? Its date 5/31, this  is a re transplant.     Thanks,   padmini

## 2023-08-14 ENCOUNTER — TELEPHONE (OUTPATIENT)
Dept: TRANSPLANT | Facility: CLINIC | Age: 63
End: 2023-08-14
Payer: MEDICARE

## 2023-08-14 NOTE — TELEPHONE ENCOUNTER
called patient to follow up on back up caregiver plan. No answer, and voicemail not set up.     Aleksandra Denise LMSW

## 2023-08-16 ENCOUNTER — TELEPHONE (OUTPATIENT)
Dept: TRANSPLANT | Facility: CLINIC | Age: 63
End: 2023-08-16
Payer: MEDICARE

## 2023-08-17 ENCOUNTER — TELEPHONE (OUTPATIENT)
Dept: TRANSPLANT | Facility: CLINIC | Age: 63
End: 2023-08-17
Payer: MEDICARE

## 2023-08-17 NOTE — TELEPHONE ENCOUNTER
spoke with patient over the phone. Patient reports his sister Rosemary Hunt ([phone]) will act as back up caregivers. Patient provided this  with verbal consent to call his sister to confirm back up care giving. Patient and his wife denied having additional questions, needs, or concerns at this time.      called patients sister. Patients sister reports she will act as backup caregiver post transplant.  provided caregiver education over the phone. Patients sister denied additional questions at this time, and confirmed she will contact transplant team if questions arise.       Aleksandra Denise LMSW

## 2023-08-18 ENCOUNTER — COMMITTEE REVIEW (OUTPATIENT)
Dept: TRANSPLANT | Facility: CLINIC | Age: 63
End: 2023-08-18
Payer: MEDICARE

## 2023-08-18 NOTE — COMMITTEE REVIEW
Native Organ Dx: Membranous Glomerulonephritis      Not approved for LRD/CAD transplant due to severe vascular disease  making the placement of a kidney surgically impossible.      Note written by  Padmini Corado RN    ===============================================    I was present at the meeting and attest to the general consensus of the committee.   Theo Ross Jr.

## 2023-08-18 NOTE — LETTER
August 22, 2023    Anthony Burgess  [address]  [place] [address]        Dear Anthony Burgess:  MRN: [id number]    It is the duty of the [facility] Selection [facility] to determine which patients are candidates for a transplant. For this reason, our committee has the difficult task of evaluating patients to determine which ones have the greatest chance of having a successful transplant. We are aware of the magnitude of this responsibility, and we approach it with reverence and humility.    It is with regret I inform you that you are not approved as a transplant candidate due to  severe vascular disease .  Based on this review, we have determined that at this time, you are not a candidate for a transplant at [facility].      The selection committee carefully considers each patient's transplant candidacy and determines whether it is safe to proceed with transplantation on a case-by-case basis using established selection criteria.  At present, the risk of proceeding with an elective transplant surgery has become too high.                                                                               Although the selection committee believes you are not a suitable transplant candidate, you have the option to be evaluated at other transplant centers who may have different selection criteria.  You may request your [facility] records be sent to any center of your choice by contacting our Medical Records Department at [phone].                                                                               Attached is a letter from the [employer] ([employer]).  It describes the services and information offered to patients by [employer] and the Organ Procurement and Transplant Network.    The [facility] sincerely wishes you the best and remains available to answer any questions.  Please do not hesitate to contact our pre-transplant office if we can assist you in any other  way.                                                                               Sincerely,      Rose Can MD  Medical Director, Kidney & Kidney/Pancreas Transplantation        Encl: [employer] Letter               The [employer] Proc[employer]ment and [employer]plantation Network   Toll-free patient services line: [phone]  Your resource for organ transplant information      Staffed 8:30 am - 5:00 pm ET Monday - Friday   Leave a message 24/7 to receive a call back    The Organ Procurement and Transplantation Network (OPTN) is the national transplant system. It makes the policies that decide how donated organs are matched to patients waiting for a transplant. The OPTN:    Makes sure donated organs get matched to people on the transplant waiting list  Tells people about the donation and transplant processes  Makes sure that the public knows about the need for more organ and tissue donations    The OPTN has a free patient services line that you can call to:  Get more information about:   o Organ donation and organ transplants   o Donation and transplant policies  Get an information kit with:   o A list of transplant hospitals   o Waiting list information  Talk about any questions you may have about your transplant hospital or organ procurement organization. The staff will do their best to help you or point you to others who may help.  Find out how you can volunteer with the OPTN and help shape transplant policy    The patient services line number is: 6-955-913-7383    Patient services line staff CANNOT answer questions about your own medical care, including:  Waiting list status  Test results  Medical records  You will need to call your transplant hospital for this information.    The following websites have more information about transplantation and donation:  OPTN: https://[facility]n.[facility].gov/  For potential living donors and transplant recipients:   o Living with transplant:  https://www.transplantliving.org/   o Living donation process: https://optn.transplant.[facility]rs[facility].gov/living-donation/     o Financial assistance: https://www.livingdonorassistance.org/  Transplantation data: https://www.srtr.org/  Organ donation: https://www.organdonor.gov/    Volunteer with the OPTN: https://optn.transplant.hrsa.gov/get-involved

## 2025-03-01 NOTE — NURSING
Note opened in error .   Received call from Richard FARRAR glucose level 452 nonfasting pt states he had just eaten a meal prior to lab being drawn. rx'd insulin taken today.New orders received to redraw POC accucheck prior to d/c this evening if no s/s of hyperglycemia d/c home.